# Patient Record
Sex: FEMALE | Race: BLACK OR AFRICAN AMERICAN | NOT HISPANIC OR LATINO | Employment: OTHER | URBAN - METROPOLITAN AREA
[De-identification: names, ages, dates, MRNs, and addresses within clinical notes are randomized per-mention and may not be internally consistent; named-entity substitution may affect disease eponyms.]

---

## 2021-04-08 DIAGNOSIS — Z23 ENCOUNTER FOR IMMUNIZATION: ICD-10-CM

## 2021-06-14 LAB — HBA1C MFR BLD HPLC: 6.5 %

## 2022-07-28 LAB
LEFT EYE DIABETIC RETINOPATHY: NORMAL
RIGHT EYE DIABETIC RETINOPATHY: NORMAL

## 2023-01-18 LAB — HBA1C MFR BLD HPLC: 6.7 %

## 2023-11-16 ENCOUNTER — OFFICE VISIT (OUTPATIENT)
Dept: CARDIOLOGY CLINIC | Facility: CLINIC | Age: 73
End: 2023-11-16
Payer: COMMERCIAL

## 2023-11-16 VITALS
BODY MASS INDEX: 34.23 KG/M2 | OXYGEN SATURATION: 98 % | DIASTOLIC BLOOD PRESSURE: 78 MMHG | HEART RATE: 78 BPM | HEIGHT: 66 IN | WEIGHT: 213 LBS | SYSTOLIC BLOOD PRESSURE: 136 MMHG

## 2023-11-16 DIAGNOSIS — E11.9 TYPE 2 DIABETES MELLITUS WITHOUT COMPLICATION, WITHOUT LONG-TERM CURRENT USE OF INSULIN (HCC): ICD-10-CM

## 2023-11-16 DIAGNOSIS — E78.5 DYSLIPIDEMIA: ICD-10-CM

## 2023-11-16 DIAGNOSIS — R07.9 CHEST PAIN, UNSPECIFIED TYPE: Primary | ICD-10-CM

## 2023-11-16 PROCEDURE — 99204 OFFICE O/P NEW MOD 45 MIN: CPT | Performed by: INTERNAL MEDICINE

## 2023-11-16 PROCEDURE — 93000 ELECTROCARDIOGRAM COMPLETE: CPT | Performed by: INTERNAL MEDICINE

## 2023-11-16 RX ORDER — AMLODIPINE BESYLATE 5 MG/1
5 TABLET ORAL DAILY
COMMUNITY
Start: 2023-09-18

## 2023-11-16 RX ORDER — OMEPRAZOLE 20 MG/1
20 TABLET, DELAYED RELEASE ORAL DAILY
COMMUNITY

## 2023-11-16 RX ORDER — ASPIRIN 81 MG/1
81 TABLET ORAL DAILY
COMMUNITY

## 2023-11-16 RX ORDER — BLOOD-GLUCOSE METER
EACH MISCELLANEOUS
COMMUNITY
Start: 2023-10-02

## 2023-11-16 RX ORDER — OMEPRAZOLE 40 MG/1
CAPSULE, DELAYED RELEASE ORAL
COMMUNITY

## 2023-11-16 RX ORDER — FLUTICASONE PROPIONATE 50 MCG
2 SPRAY, SUSPENSION (ML) NASAL DAILY
COMMUNITY
Start: 2023-09-11

## 2023-11-16 RX ORDER — OLOPATADINE HYDROCHLORIDE 2 MG/ML
1 SOLUTION/ DROPS OPHTHALMIC
COMMUNITY

## 2023-11-16 NOTE — LETTER
November 16, 2023     Latasha Rutherford MD  [address] [facility] [address]    Patient: Jose Angel Ocampo   YOB: 1950   Date of Visit: 11/16/2023       Dear Dr. Ashwini Hernandez:    Thank you for referring Jose Angel Ocampo to me for evaluation. Below are my notes for this consultation. If you have questions, please do not hesitate to call me. I look forward to following your patient along with you. Sincerely,        Yulissa Lopez MD        CC: No Recipients    Yulissa Lopez MD  11/16/2023  1:58 PM  Sign when Signing Visit  Cardiology   Jose Angel Ocampo 74 y.o. female MRN: [id number]   Encounter: [id number]        Reason for Consult / Principal Problem:Chest pain, dyslipidemia    Physician Requesting Consult:  Latasha Rutherford MD    PCP: Latasha Rutherford MD        Assessment/Plan:    >> Chest pain: Possibly cardiac she has numerous risk factors for coronary disease, she has poor exercise capacity and is unlikely to be able to complete a treadmill stress. >> Hyperlipidemia: Has FAILED multiple statins: Failed rosuvastatin, atorvastatin, simvastatin, pravastatin. Has severely elevated LDL cholesterol  >> Type 2 diabetes: On metformin  >> Hypertension: On amlodipine  >> CKD stage II plan:        Plan:    -Check nuclear pharmacologic stress test to evaluate for coronary ischemia  -Start Praluent 75 mg once every 2 weeks, subcutaneously. Will reassess after 1 month of therapy and consider increasing the dose  -Continue amlodipine for hypertension, asked her to keep a log of her blood pressures for 1 week and call in the numbers, will consider adding losartan 25 mg for blood pressure and renal protection.  -Follow-up in 3 months or sooner if testing is abnormal.        CC: Chest pain, dyslipidemia    HPI  74 y.o. female presents to hospitals care.   She has a history of dyslipidemia and has been tried on multiple medications including rosuvastatin, atorvastatin, simvastatin, pravastatin and has failed all of these medications due to severe muscle aches and nausea. She has numerous risk factors for atherosclerotic disease including diabetes, dyslipidemia, hypertension. Her last LDL was 215. She has also been complaining of chest pains on the left side of her chest radiating to the left shoulder. These are not strictly associated with exertion (her physical exertion is minimal). They can occur anytime. Not associated with nausea vomiting or diaphoresis      The patient denies palpitations, orthopnea, PND, pedal edema, syncope, presyncope, diaphoresis, nausea/vomiting       SH: Smoking/Tobacco/Vaping: Never; Alcohol: Never; Drugs: Never            Physical exam  Objective  Vitals: Blood pressure 136/78, pulse 78, height 5' 6" (1.676 m), weight 96.6 kg (213 lb), SpO2 98 %. General:  AO x3, no acute distress  Cardiac:  S1-S2 normal,  No murmurs, rubs or gallops, JVP: Not seen  Lungs:  Clear to auscultation bilaterally, no wheezing or crackles. Abdomen:  Soft, nontender, nondistended. Extremities:  Warm, well perfused, pulses palpable, no ulcers or rashes. Edema: Mild pitting  Neuro: Grossly nonfocall        ======================================================  TREADMILL STRESS  No results found for this or any previous visit.     ----------------------------------------------------------------------------------------------  NUCLEAR STRESS TEST: No results found for this or any previous visit. No results found for this or any previous visit.      --------------------------------------------------------------------------------  CATH:  No results found for this or any previous visit.    --------------------------------------------------------------------------------  ECHO:   No results found for this or any previous visit.     No results found for this or any previous visit.    --------------------------------------------------------------------------------  HOLTER  No results found for this or any previous visit.    --------------------------------------------------------------------------------  CAROTIDS  No results found for this or any previous visit. Diagnoses and all orders for this visit:    Chest pain, unspecified type  -     POCT ECG    Other orders  -     amLODIPine (NORVASC) 5 mg tablet; Take 5 mg by mouth daily  -     aspirin (ECOTRIN LOW STRENGTH) 81 mg EC tablet; Take 81 mg by mouth daily  -     fluticasone (FLONASE) 50 mcg/act nasal spray; 2 sprays daily  -     OneTouch Verio test strip; TEST TWICE A DAY  -     metFORMIN (GLUCOPHAGE) 500 mg tablet; Take 500 mg by mouth in the morning  -     olopatadine HCl (PATADAY) 0.2 % opth drops; 1 drop  -     omeprazole (PriLOSEC) 40 MG capsule; omeprazole 40 mg capsule,delayed release (Patient not taking: Reported on 11/16/2023)  -     omeprazole (PriLOSEC OTC) 20 MG tablet; Take 20 mg by mouth daily       ======================================================          Review of Systems  ROS as noted above, otherwise 12 point review of systems was performed and is negative. Historical Information  No past medical history on file. No past surgical history on file. Social History     Substance and Sexual Activity   Alcohol Use Not on file     Social History     Substance and Sexual Activity   Drug Use Not on file     Social History     Tobacco Use   Smoking Status Not on file   Smokeless Tobacco Not on file     No family history on file. Meds/Allergies  Hospital Medications:   No current facility-administered medications for this visit. Home Medications: (Not in a hospital admission)      Allergies   Allergen Reactions   • Penicillins Hives and Swelling     Tongue swelling   • Simvastatin Myalgia   • Lisinopril Cough         Portions of the record may have been created with voice recognition software.   Occasional wrong words or "sound a like" substitutions may have occurred due to the inherent limitations of voice recognition software. Read the chart carefully and recognize, using context, where substitutions have occurred.

## 2023-11-16 NOTE — PROGRESS NOTES
Cardiology   Nevaeh Cardona 74 y.o. female MRN: [id number]   Encounter: [id number]        Reason for Consult / Principal Problem:Chest pain, dyslipidemia    Physician Requesting Consult:  Frank Gonzalez MD    PCP: Frank Gonzalez MD        Assessment/Plan:    >> Chest pain: Possibly cardiac she has numerous risk factors for coronary disease, she has poor exercise capacity and is unlikely to be able to complete a treadmill stress. >> Hyperlipidemia: Has FAILED multiple statins: Failed rosuvastatin, atorvastatin, simvastatin, pravastatin. Has severely elevated LDL cholesterol  >> Type 2 diabetes: On metformin  >> Hypertension: On amlodipine  >> CKD stage II plan:        Plan:    -Check nuclear pharmacologic stress test to evaluate for coronary ischemia  -Start Praluent 75 mg once every 2 weeks, subcutaneously. Will reassess after 1 month of therapy and consider increasing the dose  -Continue amlodipine for hypertension, asked her to keep a log of her blood pressures for 1 week and call in the numbers, will consider adding losartan 25 mg for blood pressure and renal protection.  -Follow-up in 3 months or sooner if testing is abnormal.        CC: Chest pain, dyslipidemia    HPI  74 y.o. female presents to establish care. She has a history of dyslipidemia and has been tried on multiple medications including rosuvastatin, atorvastatin, simvastatin, pravastatin and has failed all of these medications due to severe muscle aches and nausea. She has numerous risk factors for atherosclerotic disease including diabetes, dyslipidemia, hypertension. Her last LDL was 215. She has also been complaining of chest pains on the left side of her chest radiating to the left shoulder. These are not strictly associated with exertion (her physical exertion is minimal). They can occur anytime.   Not associated with nausea vomiting or diaphoresis      The patient denies palpitations, orthopnea, PND, pedal edema, syncope, presyncope, diaphoresis, nausea/vomiting       SH: Smoking/Tobacco/Vaping: Never; Alcohol: Never; Drugs: Never            Physical exam  Objective   Vitals: Blood pressure 136/78, pulse 78, height 5' 6" (1.676 m), weight 96.6 kg (213 lb), SpO2 98 %. General:  AO x3, no acute distress  Cardiac:  S1-S2 normal,  No murmurs, rubs or gallops, JVP: Not seen  Lungs:  Clear to auscultation bilaterally, no wheezing or crackles. Abdomen:  Soft, nontender, nondistended. Extremities:  Warm, well perfused, pulses palpable, no ulcers or rashes. Edema: Mild pitting  Neuro: Grossly nonfocall        ======================================================  TREADMILL STRESS  No results found for this or any previous visit.     ----------------------------------------------------------------------------------------------  NUCLEAR STRESS TEST: No results found for this or any previous visit. No results found for this or any previous visit.      --------------------------------------------------------------------------------  CATH:  No results found for this or any previous visit.    --------------------------------------------------------------------------------  ECHO:   No results found for this or any previous visit. No results found for this or any previous visit.    --------------------------------------------------------------------------------  HOLTER  No results found for this or any previous visit.    --------------------------------------------------------------------------------  CAROTIDS  No results found for this or any previous visit. Diagnoses and all orders for this visit:    Chest pain, unspecified type  -     POCT ECG    Other orders  -     amLODIPine (NORVASC) 5 mg tablet; Take 5 mg by mouth daily  -     aspirin (ECOTRIN LOW STRENGTH) 81 mg EC tablet;  Take 81 mg by mouth daily  -     fluticasone (FLONASE) 50 mcg/act nasal spray; 2 sprays daily  -     OneTouch Verio test strip; TEST TWICE A DAY  - metFORMIN (GLUCOPHAGE) 500 mg tablet; Take 500 mg by mouth in the morning  -     olopatadine HCl (PATADAY) 0.2 % opth drops; 1 drop  -     omeprazole (PriLOSEC) 40 MG capsule; omeprazole 40 mg capsule,delayed release (Patient not taking: Reported on 11/16/2023)  -     omeprazole (PriLOSEC OTC) 20 MG tablet; Take 20 mg by mouth daily       ======================================================          Review of Systems  ROS as noted above, otherwise 12 point review of systems was performed and is negative. Historical Information   No past medical history on file. No past surgical history on file. Social History     Substance and Sexual Activity   Alcohol Use Not on file     Social History     Substance and Sexual Activity   Drug Use Not on file     Social History     Tobacco Use   Smoking Status Not on file   Smokeless Tobacco Not on file     No family history on file. Meds/Allergies   Hospital Medications:   No current facility-administered medications for this visit. Home Medications: (Not in a hospital admission)      Allergies   Allergen Reactions    Penicillins Hives and Swelling     Tongue swelling    Simvastatin Myalgia    Lisinopril Cough         Portions of the record may have been created with voice recognition software. Occasional wrong words or "sound a like" substitutions may have occurred due to the inherent limitations of voice recognition software. Read the chart carefully and recognize, using context, where substitutions have occurred.

## 2023-11-30 ENCOUNTER — HOSPITAL ENCOUNTER (OUTPATIENT)
Dept: NON INVASIVE DIAGNOSTICS | Facility: CLINIC | Age: 73
Discharge: HOME | End: 2023-11-30
Payer: COMMERCIAL

## 2023-11-30 VITALS — BODY MASS INDEX: 34.23 KG/M2 | WEIGHT: 213 LBS | HEIGHT: 66 IN

## 2023-11-30 DIAGNOSIS — R07.9 CHEST PAIN, UNSPECIFIED TYPE: ICD-10-CM

## 2023-11-30 LAB
MAX HR PERCENT: 75 %
MAX HR: 110 BPM
NUC STRESS EJECTION FRACTION: 62 %
RATE PRESSURE PRODUCT: NORMAL
SL CV REST NUCLEAR ISOTOPE DOSE: 10.51 MCI
SL CV STRESS NUCLEAR ISOTOPE DOSE: 32.2 MCI
SL CV STRESS RECOVERY BP: NORMAL MMHG
SL CV STRESS RECOVERY HR: 81 BPM
SL CV STRESS RECOVERY O2 SAT: 99 %
STRESS ANGINA INDEX: 0
STRESS BASELINE BP: NORMAL MMHG
STRESS BASELINE HR: 58 BPM
STRESS O2 SAT REST: 97 %
STRESS PEAK HR: 96 BPM
STRESS POST ESTIMATED WORKLOAD: 1 METS
STRESS POST O2 SAT PEAK: 99 %
STRESS POST PEAK BP: 160 MMHG
STRESS/REST PERFUSION RATIO: 1.21

## 2023-11-30 PROCEDURE — 78452 HT MUSCLE IMAGE SPECT MULT: CPT | Performed by: INTERNAL MEDICINE

## 2023-11-30 PROCEDURE — A9502 TC99M TETROFOSMIN: HCPCS

## 2023-11-30 PROCEDURE — 78452 HT MUSCLE IMAGE SPECT MULT: CPT

## 2023-11-30 PROCEDURE — 93018 CV STRESS TEST I&R ONLY: CPT | Performed by: INTERNAL MEDICINE

## 2023-11-30 PROCEDURE — 93017 CV STRESS TEST TRACING ONLY: CPT

## 2023-11-30 PROCEDURE — G1004 CDSM NDSC: HCPCS

## 2023-11-30 PROCEDURE — 93016 CV STRESS TEST SUPVJ ONLY: CPT | Performed by: INTERNAL MEDICINE

## 2023-11-30 RX ORDER — REGADENOSON 0.08 MG/ML
0.4 INJECTION, SOLUTION INTRAVENOUS ONCE
Status: COMPLETED | OUTPATIENT
Start: 2023-11-30 | End: 2023-11-30

## 2023-11-30 RX ADMIN — REGADENOSON 0.4 MG: 0.08 INJECTION, SOLUTION INTRAVENOUS at 11:03

## 2023-12-01 ENCOUNTER — TELEPHONE (OUTPATIENT)
Dept: CARDIOLOGY CLINIC | Facility: CLINIC | Age: 73
End: 2023-12-01

## 2023-12-01 ENCOUNTER — DOCUMENTATION (OUTPATIENT)
Dept: CARDIOLOGY CLINIC | Facility: CLINIC | Age: 73
End: 2023-12-01

## 2023-12-01 ENCOUNTER — PREP FOR PROCEDURE (OUTPATIENT)
Dept: CARDIOLOGY CLINIC | Facility: CLINIC | Age: 73
End: 2023-12-01

## 2023-12-01 DIAGNOSIS — R07.9 CHEST PAIN, UNSPECIFIED TYPE: Primary | ICD-10-CM

## 2023-12-01 DIAGNOSIS — R94.39 ABNORMAL STRESS ECG: Primary | ICD-10-CM

## 2023-12-01 DIAGNOSIS — I10 HYPERTENSION, UNSPECIFIED TYPE: Primary | ICD-10-CM

## 2023-12-01 DIAGNOSIS — I25.10 CORONARY ARTERY DISEASE INVOLVING NATIVE CORONARY ARTERY OF NATIVE HEART WITHOUT ANGINA PECTORIS: ICD-10-CM

## 2023-12-01 NOTE — PROGRESS NOTES
Abnormal stress test noted. There appears to be a mild reversible anterior wall defect with a a severe defect in the mid anterior wall (possibly breast artifact). There appears to be a fixed inferolateral wall defect. Will order [facility] to evaluate coronary anatomy. In the interim patient should have started praluent (intolerant to statins), will continue ASA and add metoprolol 25 mg bid.

## 2023-12-01 NOTE — LETTER
2023       Chace Meza              : 1950        MRN: [id number]  [address] [place] [address]     Procedure Name:   CARDIAC  CATHETERIZATION    Procedure date: 2023    Blood work to be done before 12/15/2023    The hospital will contact you the day prior to your procedure, usually between 4PM - 6PM to instruct you on the time and place to report. If you do not hear from a [facility]'s  by 6PM the evening prior to your procedure, please contact the campus that you are scheduled at. LOCATION   [place]: [address] [place] [address] [facility] [phone]       You may have nothing to eat or drink from midnight the night prior to your procedure. You may have a minimal amount of water with your morning medications. DO NOT stop taking Plavix or Aspirin unless advised otherwise. If your procedure is scheduled after 12:00 noon, you may have clear liquids until 8:00AM the morning of your procedure. Clear liquids are 7UP, Ginger Ale, Jello or broth. Arrange for a responsible person to drive you to and from the hospital.    Please shower your procedure and do not use powders or lotions. Please notify us if you have been admitted to the hospital within the past 30 days. Bring a list of daily medications, vitamins, minerals, herbals and nutritional supplements you take. Include dosage and time you take them each day. If packing an overnight bag, pack minimal clothing, you will be given hospital sleepwear. Do not bring money, valuables or jewelry. Wedding band is OK. If you use CPAP machine, bring it to the hospital.      Have your Photo ID and Insurance cards with you. DO NOT take any diabetic medication, including insulin, the morning of the procedure. Oral diabetic medications may include: Glucophage, Prandin, Glyburide, Micronase, Avandia, Glocovance, Precose, Glynase y Starlix.     You should continue to take your morning dose of heart and/or blood pressure medications with a sip of water UNLESS ADVISED OTHERWISE. Special Instructions:    METFORMIN: DO NOT take this medication in the morning of the procedure.         Thank you,   Elzie Bosworth  Surgery Coordinator  [facility]  Carli SINGH  Ph: [phone]

## 2023-12-01 NOTE — TELEPHONE ENCOUNTER
I spoke with the patient regarding her abnormal stress test. She confirmed she is taking praluent and low dose aspirin, with the addition of Metoprolol tartrate 25 mg bid. L heart cath will be scheduled. Patient verbalized understanding to all that was discussed.

## 2023-12-04 NOTE — TELEPHONE ENCOUNTER
Called patient in regard cardiac cath to be schedule. Patient Don't want to schedule procedure until the Dr speak with her  to explained procedure and reason why she is needing this procedure. She mentioned her  is off from work on Thursday, she would like to either have a consult or they can call her . Thank you.

## 2023-12-06 NOTE — TELEPHONE ENCOUNTER
Dr Zan Conti just following on this patient request.  Patient and  would like to speak with you prior to scheduled the procedure. Thank you.

## 2023-12-07 ENCOUNTER — OFFICE VISIT (OUTPATIENT)
Dept: CARDIOLOGY CLINIC | Facility: CLINIC | Age: 73
End: 2023-12-07
Payer: COMMERCIAL

## 2023-12-07 ENCOUNTER — TELEPHONE (OUTPATIENT)
Dept: CARDIOLOGY CLINIC | Facility: CLINIC | Age: 73
End: 2023-12-07

## 2023-12-07 VITALS
BODY MASS INDEX: 34.59 KG/M2 | HEART RATE: 80 BPM | DIASTOLIC BLOOD PRESSURE: 84 MMHG | OXYGEN SATURATION: 98 % | SYSTOLIC BLOOD PRESSURE: 160 MMHG | WEIGHT: 214.3 LBS

## 2023-12-07 DIAGNOSIS — R07.9 CHEST PAIN, UNSPECIFIED TYPE: Primary | ICD-10-CM

## 2023-12-07 DIAGNOSIS — R94.39 ABNORMAL NUCLEAR STRESS TEST: ICD-10-CM

## 2023-12-07 PROCEDURE — 99213 OFFICE O/P EST LOW 20 MIN: CPT | Performed by: INTERNAL MEDICINE

## 2023-12-07 NOTE — TELEPHONE ENCOUNTER
----- Message from Cyndy Morocho MD sent at 12/6/2023  4:34 PM EST -----  Regarding: Appointment  Can you please set her up for a 20 appointment tomorrow afternoon 12/7 at 2/2.30 PM at [address]? I tried calling her and her  but there was no response. She did say in a previous message that her  is off Thursday. Alternatively we could do a tele visit if she cannot come in (I believe they live quite far away).      TY

## 2023-12-07 NOTE — TELEPHONE ENCOUNTER
I called the patient and offered an appt this afternoon, or a phone visit. She said she will call back to let us know.

## 2023-12-07 NOTE — TELEPHONE ENCOUNTER
Patient scheduled for LHC at [facility] on  12/21/2023 with Dr Tuyet Edge. Patient also scheduled to have an echo on this day. Patient aware of general instructions, mail out with labs order. Meds holds: Metformin to hold the morning of the procedure. Can we please check insurance for service.

## 2023-12-08 NOTE — TELEPHONE ENCOUNTER
Tariq Mendez pending [facility] # [id number] for [id number] @ [facility]. Dr. Gonzalez Roche.   Clinical uploaded

## 2023-12-08 NOTE — PROGRESS NOTES
Cardiology   Toshia Reyes 74 y.o. female MRN: [id number]   Encounter: [id number]        Reason for Consult / Principal Problem:Chest pain, dyslipidemia    Physician Requesting Consult:  Katerin Ellis MD    PCP: Katerin Ellis MD        Assessment/Plan:    >> Chest pain: Now with abnormal pharmacologic stress test.  >> Hyperlipidemia: Has FAILED multiple statins: Failed rosuvastatin, atorvastatin, simvastatin, pravastatin. Has severely elevated LDL cholesterol  >> Type 2 diabetes: On metformin  >> Hypertension: On amlodipine  >> CKD stage II         Plan:    -Refer for left heart catheterization to evaluate coronary anatomy  -Risks benefits and alternatives were discussed at length. All questions were answered.  -Continue Praluent 75 mg once every 2 weeks, subcutaneously. -Continue amlodipine 5 mg  -Start metoprolol 25 mg daily  -Continue baby aspirin daily  -Follow-up after left heart cath. 12/7/2023: Patient wanted to discuss stress test result. She continues to have intermittent chest pains. Her pharmacologic stress test demonstrated ischemia possibly in the diagonal and circumflex territories. Cannot exclude LAD ischemia. CC: Chest pain, dyslipidemia    HPI  74 y.o. female presents to establish care. She has a history of dyslipidemia and has been tried on multiple medications including rosuvastatin, atorvastatin, simvastatin, pravastatin and has failed all of these medications due to severe muscle aches and nausea. She has numerous risk factors for atherosclerotic disease including diabetes, dyslipidemia, hypertension. Her last LDL was 215. She has also been complaining of chest pains on the left side of her chest radiating to the left shoulder. These are not strictly associated with exertion (her physical exertion is minimal). They can occur anytime.   Not associated with nausea vomiting or diaphoresis      The patient denies palpitations, orthopnea, PND, pedal edema, syncope, presyncope, diaphoresis, nausea/vomiting       SH: Smoking/Tobacco/Vaping: Never; Alcohol: Never; Drugs: Never            Physical exam  Objective   Vitals: Blood pressure 160/84, pulse 80, weight 97.2 kg (214 lb 4.8 oz), SpO2 98 %. General:  AO x3, no acute distress  Cardiac:  S1-S2 normal,  No murmurs, rubs or gallops, JVP: Not seen  Lungs:  Clear to auscultation bilaterally, no wheezing or crackles. Abdomen:  Soft, nontender, nondistended. Extremities:  Warm, well perfused, pulses palpable, no ulcers or rashes. Edema: Mild pitting  Neuro: Grossly nonfocall        ======================================================  TREADMILL STRESS  No results found for this or any previous visit.     ----------------------------------------------------------------------------------------------  NUCLEAR STRESS TEST: No results found for this or any previous visit. No results found for this or any previous visit.      --------------------------------------------------------------------------------  CATH:  No results found for this or any previous visit.    --------------------------------------------------------------------------------  ECHO:   No results found for this or any previous visit. No results found for this or any previous visit.    --------------------------------------------------------------------------------  HOLTER  No results found for this or any previous visit.    --------------------------------------------------------------------------------  CAROTIDS  No results found for this or any previous visit. There are no diagnoses linked to this encounter.     ======================================================          Review of Systems  ROS as noted above, otherwise 12 point review of systems was performed and is negative. Historical Information   No past medical history on file. No past surgical history on file.   Social History     Substance and Sexual Activity   Alcohol Use Not on file     Social History     Substance and Sexual Activity   Drug Use Not on file     Social History     Tobacco Use   Smoking Status Not on file   Smokeless Tobacco Not on file     No family history on file. Meds/Allergies   Hospital Medications:   No current facility-administered medications for this visit. Home Medications: (Not in a hospital admission)      Allergies   Allergen Reactions    Penicillins Hives and Swelling     Tongue swelling    Simvastatin Myalgia    Lisinopril Cough         Portions of the record may have been created with voice recognition software. Occasional wrong words or "sound a like" substitutions may have occurred due to the inherent limitations of voice recognition software. Read the chart carefully and recognize, using context, where substitutions have occurred.

## 2023-12-13 LAB
CHEST PAIN STATEMENT: NORMAL
MAX DIASTOLIC BP: 92 MMHG
MAX PREDICTED HEART RATE: 145 BPM
PROTOCOL NAME: NORMAL
REASON FOR TERMINATION: NORMAL
STRESS POST EXERCISE DUR MIN: 3 MIN
STRESS POST EXERCISE DUR SEC: 0 SEC
STRESS POST PEAK HR: 110 BPM
STRESS POST PEAK SYSTOLIC BP: 180 MMHG
TARGET HR FORMULA: NORMAL
TEST INDICATION: NORMAL

## 2023-12-14 LAB — HBA1C MFR BLD HPLC: 6.2 %

## 2023-12-19 ENCOUNTER — TELEPHONE (OUTPATIENT)
Dept: CARDIOLOGY CLINIC | Facility: CLINIC | Age: 73
End: 2023-12-19

## 2023-12-19 NOTE — TELEPHONE ENCOUNTER
Spoke with patient in regard cardiac cath and Echo, cancelled for 12/21/2023 due that patient is very sick.  She will call back when feel better to reschedule.

## 2023-12-19 NOTE — TELEPHONE ENCOUNTER
My name is, my name is Pamela.   I'm supposed to do a procedure on Thursday, but I am sick, I'm not well and my doctor said that I should go and take the COVID test before he can treat me     So that's why I'm letting you guys know I don't think I'll be able to do it. Thank you [phone].

## 2023-12-27 DIAGNOSIS — E78.5 DYSLIPIDEMIA: ICD-10-CM

## 2024-02-03 NOTE — H&P (VIEW-ONLY)
Cardiology Consultation Visit       Kacey Bazan   [id number]  1950      [facility]  [address] [place] [address] [place] [address]      Physician Requesting Consult:   No ref. provider found  PCP: Demetrius Charlton MD    Reason for Consultation / Principal Problem:  Mrs. Kacey Bazan is a 73 y.o. female and never smoker with a PMH significant for but not limited to CAD (nonobstructive, 2019), HLD, HTN, DMII, GERD, and Obesity.  She presents to clinic for Interventional Cardiology Consultation .    History of Present Illness:  Mrs. Bazan was at her baseline state of health: independent of adl's, retired as a CNA , though not exercising regularly, when she  had a fall 3 months ago .  She states that she at home and walking downstairs when she slipped, hit her chest against the railing, and slid down the stairs.  She managed the event conservatively but eventually saw our partner, Dr. Jiang for ongoing CP on 11/16/23.  She described resting and exertional pain, so she underwent a NM MPI that was positive for a fixed anterior infarct with roxanna-infarct ischemia.  She was started on praluent but noted constipation on the medication and discontinued it.  She also noted increased depression and lightheadedness on metoprolol, so that too was discontinued. She was referred to C but had reservations. She states her last LHC was at [facility] in [place], [place] and was negative obstructive CAD.    For her intermittent CP and HERNANDEZ, she is now ready to proceed with [facility] for evalaution.  She currently denies any diaphoresis, n/v, sob, palpitations, syncope, orthopnea, pnd, or ble edema.  She denies any recent hospitalizations, prior cardiac history, family history of early cad, or family history of scd. She notes fair control of her diet and exercise habits. She reports a diet that is somewhat balanced but has room for improvement, salt intake that is well  controlled, sufficient daily water intake, no caffeine intake, no alcohol intake, and no dedicated exercise. She is otherwise compliant with medications but does not maintain a detailed BP log.  Of note, the patient's cardiac risk factor(s) include: advanced age, hypertension, dyslipidemia, diabetes mellitus, obesity, and sedentary lifestyle.    Assessment & Plan   CP, etiology likely cardiac given NM MPI  Patient continues to have recurrent symptoms, Pharmacologic NM MPI with a fixed ant defect with roxanna-infarct ischemia  Ischemic testing with [facility], Proceed with already ordered TTE, Cont GDMT for CAD on asa / trial of low dose cresto qod / trial of repatha / hold on bb for now, Cont aggressive risk factor modification, Maintain BP log, Cont counseling on diet/lifestyle modification for weight management as well  Monitor closely for symptomatic changes, Record BP/HR and log if symptoms return, ED/Clinic precautions reviewed    HTN, long-standing  No home BP log for review, but clinic SBP in the 135-160 mmHg range  Cont current medication regimen, cont counseling on low-sodium diet, BP log given, Review the importance of maintaining a home BP log  Monitor BP at home routinely and review log on next visit    HLD, recent FLP:  /  / HDL 44 /  on 04/24/23  Poorly controlled, Previously on a statin, but now diet/lifestyle managed  Check FLP at [facility], Will start crestor 5 qod and trial of repatha, Cont counseling on diet and lifestyle modification  Monitor FLP as noted above, will reassess on next visit    DM Type II, most recent A1c 6.2 on 12/14/23  Tolerating medical mgmt without progressive symptoms  Check A1c per PCP, Cont counseling on diet and lifestyle changes  Monitor labs as noted above, will reassess on next visit    Obesity, Body mass index is 33.89 kg/m².  Weight has improved significant with a 70 lb weight loss from 285 to 210 two years ago  Cont to review the importance of diet and lifestyle  modification  Will monitor routinely at this time    Discussion / Summary:  Precautions and reasons to call our office or proceed to ER were discussed in detail. Patient expressed understanding and questions were answered. Plan on follow up in-clinic in 4 months.    Office Visit Diagnosis:  1. Chest pain, unspecified type        2. Abnormal nuclear stress test        3. Hyperlipidemia, unspecified hyperlipidemia type  Evolocumab 140 MG/ML SOAJ    rosuvastatin (CRESTOR) 5 mg tablet    Lipid panel      4. Hypertension, unspecified type        5. Type 2 diabetes mellitus without complication, without long-term current use of insulin ([facility])        6. Obesity (BMI 30-39.9)        7. Gastroesophageal reflux disease, unspecified whether esophagitis present            Subjective   Review of Systems   Constitutional: Negative for chills, diaphoresis, fever and malaise/fatigue.   HENT:  Negative for congestion and sore throat.    Eyes:  Negative for blurred vision and double vision.   Cardiovascular:  Positive for chest pain (left sided, resting and exertional), claudication, dyspnea on exertion (only if she goes quickly up stairs, symptoms improved off metoprolol) and near-syncope (when she was taking metoprolol). Negative for leg swelling, orthopnea, palpitations, paroxysmal nocturnal dyspnea and syncope.   Respiratory:  Negative for cough, shortness of breath, sleep disturbances due to breathing, snoring and wheezing.    Hematologic/Lymphatic: Negative for bleeding problem. Bruises/bleeds easily (while on praluent).   Skin:  Negative for itching and rash.   Musculoskeletal:  Negative for joint pain and joint swelling.   Gastrointestinal:  Positive for constipation (on praluent). Negative for abdominal pain, diarrhea, nausea and vomiting.   Genitourinary:  Negative for dysuria and hematuria.   Neurological:  Negative for numbness and paresthesias.   Psychiatric/Behavioral:  Positive for depression (while on the metoprolol).  The patient is not nervous/anxious.      The following portions of the patient's history were reviewed and updated as appropriate: past medical history, past social history, past family history, past surgical history, current medications, allergies, past surgical history and problem list.  No past medical history on file.  Social History     Socioeconomic History   • Marital status: /Civil Union     Spouse name: Not on file   • Number of children: Not on file   • Years of education: Not on file   • Highest education level: Not on file   Occupational History   • Not on file   Tobacco Use   • Smoking status: Not on file   • Smokeless tobacco: Not on file   Substance and Sexual Activity   • Alcohol use: Not on file   • Drug use: Not on file   • Sexual activity: Not on file   Other Topics Concern   • Not on file   Social History Narrative   • Not on file     Social Determinants of Health     Financial Resource Strain: Not on file   Food Insecurity: Not on file   Transportation Needs: Not on file   Physical Activity: Not on file   Stress: Not on file   Social Connections: Not on file   Intimate Partner Violence: Not on file   Housing Stability: Not on file     No family history on file.  No past surgical history on file.    Current Outpatient Medications:   •  amLODIPine (NORVASC) 5 mg tablet, Take 5 mg by mouth daily, Disp: , Rfl:   •  aspirin (ECOTRIN LOW STRENGTH) 81 mg EC tablet, Take 81 mg by mouth daily, Disp: , Rfl:   •  Evolocumab 140 MG/ML SOAJ, Inject 1 mL (140 mg total) under the skin every 14 (fourteen) days for 3 doses, Disp: 3 mL, Rfl: 0  •  fluticasone (FLONASE) 50 mcg/act nasal spray, 2 sprays into each nostril if needed, Disp: , Rfl:   •  metFORMIN (GLUCOPHAGE) 500 mg tablet, Take 500 mg by mouth in the morning, Disp: , Rfl:   •  olopatadine HCl (PATADAY) 0.2 % opth drops, 1 drop, Disp: , Rfl:   •  omeprazole (PriLOSEC OTC) 20 MG tablet, Take 20 mg by mouth if needed, Disp: , Rfl:   •   "rosuvastatin (CRESTOR) 5 mg tablet, Take 1 tablet (5 mg total) by mouth every other day, Disp: 15 tablet, Rfl: 0  •  Alirocumab 75 MG/ML SOAJ, Inject 75 mg under the skin every 14 (fourteen) days for 24 doses (Patient not taking: Reported on 2/8/2024), Disp: 2 mL, Rfl: 11  •  metoprolol tartrate (LOPRESSOR) 25 mg tablet, Take 1 tablet (25 mg total) by mouth every 12 (twelve) hours (Patient not taking: Reported on 12/7/2023), Disp: 60 tablet, Rfl: 5  •  omeprazole (PriLOSEC) 40 MG capsule, omeprazole 40 mg capsule,delayed release (Patient not taking: Reported on 11/16/2023), Disp: , Rfl:   •  OneTouch Verio test strip, TEST TWICE A DAY, Disp: , Rfl:   Allergies   Allergen Reactions   • Penicillins Hives and Swelling     Tongue swelling   • Simvastatin Myalgia   • Metoprolol Dizziness and Confusion     Sweating, cold hands and feet.    • Praluent [Alirocumab] Dizziness     Bruising     • Lisinopril Cough     There is no problem list on file for this patient.      Objective     Vitals:    02/08/24 0851   BP: 148/86   BP Location: Right arm   Patient Position: Sitting   Cuff Size: Large   Pulse: 83   SpO2: 95%   Weight: 95.3 kg (210 lb)   Height: 5' 6\" (1.676 m)     Body mass index is 33.89 kg/m².  ?  Physical Exam  Constitutional:       General: She is not in acute distress.     Appearance: She is obese. She is not toxic-appearing.   HENT:      Head: Normocephalic and atraumatic.      Right Ear: External ear normal.      Left Ear: External ear normal.      Nose: Nose normal.   Eyes:      General: No scleral icterus.        Right eye: No discharge.         Left eye: No discharge.   Neck:      Vascular: No carotid bruit.   Cardiovascular:      Rate and Rhythm: Normal rate and regular rhythm.      Pulses: Normal pulses.      Heart sounds: No murmur heard.     No gallop.   Pulmonary:      Effort: Pulmonary effort is normal. No respiratory distress.      Breath sounds: No wheezing or rales.   Musculoskeletal:      Cervical " "back: Neck supple.      Right lower leg: No edema.      Left lower leg: No edema.   Skin:     General: Skin is warm and dry.      Capillary Refill: Capillary refill takes less than 2 seconds.   Neurological:      General: No focal deficit present.      Mental Status: She is alert and oriented to person, place, and time.   Psychiatric:         Mood and Affect: Mood normal.         Behavior: Behavior normal.         Labs:  Lab Results   Component Value Date    K 4.7 05/06/2022     05/06/2022    CO2 28 05/06/2022    BUN 16 05/06/2022    CREATININE 1.30 (H) 05/06/2022    GLUC 120 (H) 05/06/2022    AST 18 05/06/2022    ALT 10 05/06/2022    TBILI 0.5 05/06/2022    ALB 3.8 05/06/2022    CALCIUM 9.4 05/06/2022      No results found for: \"WBC\", \"HGB\", \"HCT\", \"PLT\", \"INR\", \"IRON\", \"FERRITIN\", \"TIBC\", \"TRANSFERRIN\"   No results found for: \"CHOLESTEROL\", \"TRIG\", \"HDL\", \"LDLDIRECT\", \"LDLCALC\"  Lab Results   Component Value Date    HGBA1C 6.2 12/14/2023     No results found for: \"TSH\", \"FT4\"  No results found for: \"HSTNI0\", \"HSTNI2\", \"HSTNI4\", \"TROPONINI\"  No results found for: \"BNP\", \"NTBNP\"     Imaging:  I have personally reviewed pertinent reports.  No results found.    Cardiac Studies:  EKG:   Date: 11/16/23, sinus rhythm with sinus arrhythmia, lvh with repolarization abnormality    Tele:   No results found for this or any previous visit.     Holter:   No results found for this or any previous visit.    Recent Device Check:  No results found for this or any previous visit.    Previous EPS/Interventions:  No results found for this or any previous visit.    Previous Cath/PCI:  No results found for this or any previous visit.    Previous STRESS TEST:  Results for orders placed during the hospital encounter of 11/30/23  NM myocardial perfusion spect (rx stress and/or rest)  Interpretation Summary  •  Stress ECG: No ST deviation is noted. There were no arrhythmias during stress. There were no arrhythmias during recovery. . " The ECG was not diagnostic due to resting ST-T abnormalities.  •  Stress Function: Left ventricular function post-stress is normal. Stress ejection fraction is 62 %.  •  Perfusion: There is a left ventricular perfusion defect that is large in size with moderate reduction in uptake present in the entire anterior location(s) that is partially reversible. There is likely a small amount of artifact caused by breast attenuation.  •  Stress Combined Conclusion: Left ventricular perfusion is abnormal. There is mild roxanna-infarct ischemia of the entire anterior wall.  Abnormal pharmacologic perfusion scan. There is an infarct of the entire anterior wall with mild roxanna-infarct ischemia.     ECHO:  No results found for this or any previous visit.    QUINCY:  No results found for this or any previous visit.    CMR:  No results found for this or any previous visit.    Counseling / Coordination of Care:  During our visit, I spent 40 minutes with the patient, and greater than 55% of this time was spent on counseling and coordination of care, including addressing diagnostic results, prognosis, risks and benefits of treatment options, instructions for management, patient/family education, importance of treatment compliance, along with risk factor reductions.    Dictation Disclaimer:  This note was completed in part utilizing [employer] direct voice recognition software. Grammatical errors, random word insertion, spelling mistakes, and incomplete sentences may be an occasional consequence of the system secondary to software limitations, ambient noise and hardware issues. At the time of dictation, efforts were made to edit, clarify and /or correct errors.  Please read the chart carefully and recognize, using context, where substitutions have occurred.  If you have any questions or concerns about the context, text or information contained within the body of this dictation, please contact myself, the provider, for further  clarification.     Valerie Alvarez, DO 02/08/24

## 2024-02-03 NOTE — PROGRESS NOTES
Cardiology Consultation Visit       Kacey Bazan   [id number]  1950      HEART & VASCULAR [facility]  [address] [place] [address] [place] [address]      Physician Requesting Consult:   No ref. provider found  PCP: Demetrius Charlton MD    Reason for Consultation / Principal Problem:  Mrs. Kacey Bazan is a 73 y.o. female and never smoker with a PMH significant for but not limited to CAD (nonobstructive, 2019), HLD, HTN, DMII, GERD, and Obesity.  She presents to clinic for Interventional Cardiology Consultation .    History of Present Illness:  Mrs. Bazan was at her baseline state of health: independent of adl's, retired as a CNA , though not exercising regularly, when she  had a fall 3 months ago .  She states that she at home and walking downstairs when she slipped, hit her chest against the railing, and slid down the stairs.  She managed the event conservatively but eventually saw our partner, Dr. Jiang for ongoing CP on 11/16/23.  She described resting and exertional pain, so she underwent a NM MPI that was positive for a fixed anterior infarct with roxanna-infarct ischemia.  She was started on praluent but noted constipation on the medication and discontinued it.  She also noted increased depression and lightheadedness on metoprolol, so that too was discontinued. She was referred to C but had reservations. She states her last LHC was at [facility] in [place], [place] and was negative obstructive CAD.    For her intermittent CP and HERNANDEZ, she is now ready to proceed with [facility] for evalaution.  She currently denies any diaphoresis, n/v, sob, palpitations, syncope, orthopnea, pnd, or ble edema.  She denies any recent hospitalizations, prior cardiac history, family history of early cad, or family history of scd. She notes fair control of her diet and exercise habits. She reports a diet that is somewhat balanced but has room for improvement, salt intake that is well  controlled, sufficient daily water intake, no caffeine intake, no alcohol intake, and no dedicated exercise. She is otherwise compliant with medications but does not maintain a detailed BP log.  Of note, the patient's cardiac risk factor(s) include: advanced age, hypertension, dyslipidemia, diabetes mellitus, obesity, and sedentary lifestyle.    Assessment & Plan   CP, etiology likely cardiac given NM MPI  Patient continues to have recurrent symptoms, Pharmacologic NM MPI with a fixed ant defect with roxanna-infarct ischemia  Ischemic testing with [facility], Proceed with already ordered TTE, Cont GDMT for CAD on asa / trial of low dose cresto qod / trial of repatha / hold on bb for now, Cont aggressive risk factor modification, Maintain BP log, Cont counseling on diet/lifestyle modification for weight management as well  Monitor closely for symptomatic changes, Record BP/HR and log if symptoms return, ED/Clinic precautions reviewed    HTN, long-standing  No home BP log for review, but clinic SBP in the 135-160 mmHg range  Cont current medication regimen, cont counseling on low-sodium diet, BP log given, Review the importance of maintaining a home BP log  Monitor BP at home routinely and review log on next visit    HLD, recent FLP:  /  / HDL 44 /  on 04/24/23  Poorly controlled, Previously on a statin, but now diet/lifestyle managed  Check FLP at [facility], Will start crestor 5 qod and trial of repatha, Cont counseling on diet and lifestyle modification  Monitor FLP as noted above, will reassess on next visit    DM Type II, most recent A1c 6.2 on 12/14/23  Tolerating medical mgmt without progressive symptoms  Check A1c per PCP, Cont counseling on diet and lifestyle changes  Monitor labs as noted above, will reassess on next visit    Obesity, Body mass index is 33.89 kg/m².  Weight has improved significant with a 70 lb weight loss from 285 to 210 two years ago  Cont to review the importance of diet and lifestyle  modification  Will monitor routinely at this time    Discussion / Summary:  Precautions and reasons to call our office or proceed to ER were discussed in detail. Patient expressed understanding and questions were answered. Plan on follow up in-clinic in 4 months.    Office Visit Diagnosis:  1. Chest pain, unspecified type        2. Abnormal nuclear stress test        3. Hyperlipidemia, unspecified hyperlipidemia type  Evolocumab 140 MG/ML SOAJ    rosuvastatin (CRESTOR) 5 mg tablet    Lipid panel      4. Hypertension, unspecified type        5. Type 2 diabetes mellitus without complication, without long-term current use of insulin ([facility])        6. Obesity (BMI 30-39.9)        7. Gastroesophageal reflux disease, unspecified whether esophagitis present            Subjective   Review of Systems   Constitutional: Negative for chills, diaphoresis, fever and malaise/fatigue.   HENT:  Negative for congestion and sore throat.    Eyes:  Negative for blurred vision and double vision.   Cardiovascular:  Positive for chest pain (left sided, resting and exertional), claudication, dyspnea on exertion (only if she goes quickly up stairs, symptoms improved off metoprolol) and near-syncope (when she was taking metoprolol). Negative for leg swelling, orthopnea, palpitations, paroxysmal nocturnal dyspnea and syncope.   Respiratory:  Negative for cough, shortness of breath, sleep disturbances due to breathing, snoring and wheezing.    Hematologic/Lymphatic: Negative for bleeding problem. Bruises/bleeds easily (while on praluent).   Skin:  Negative for itching and rash.   Musculoskeletal:  Negative for joint pain and joint swelling.   Gastrointestinal:  Positive for constipation (on praluent). Negative for abdominal pain, diarrhea, nausea and vomiting.   Genitourinary:  Negative for dysuria and hematuria.   Neurological:  Negative for numbness and paresthesias.   Psychiatric/Behavioral:  Positive for depression (while on the metoprolol).  The patient is not nervous/anxious.      The following portions of the patient's history were reviewed and updated as appropriate: past medical history, past social history, past family history, past surgical history, current medications, allergies, past surgical history and problem list.  No past medical history on file.  Social History     Socioeconomic History   • Marital status: /Civil Union     Spouse name: Not on file   • Number of children: Not on file   • Years of education: Not on file   • Highest education level: Not on file   Occupational History   • Not on file   Tobacco Use   • Smoking status: Not on file   • Smokeless tobacco: Not on file   Substance and Sexual Activity   • Alcohol use: Not on file   • Drug use: Not on file   • Sexual activity: Not on file   Other Topics Concern   • Not on file   Social History Narrative   • Not on file     Social Determinants of Health     Financial Resource Strain: Not on file   Food Insecurity: Not on file   Transportation Needs: Not on file   Physical Activity: Not on file   Stress: Not on file   Social Connections: Not on file   Intimate Partner Violence: Not on file   Housing Stability: Not on file     No family history on file.  No past surgical history on file.    Current Outpatient Medications:   •  amLODIPine (NORVASC) 5 mg tablet, Take 5 mg by mouth daily, Disp: , Rfl:   •  aspirin (ECOTRIN LOW STRENGTH) 81 mg EC tablet, Take 81 mg by mouth daily, Disp: , Rfl:   •  Evolocumab 140 MG/ML SOAJ, Inject 1 mL (140 mg total) under the skin every 14 (fourteen) days for 3 doses, Disp: 3 mL, Rfl: 0  •  fluticasone (FLONASE) 50 mcg/act nasal spray, 2 sprays into each nostril if needed, Disp: , Rfl:   •  metFORMIN (GLUCOPHAGE) 500 mg tablet, Take 500 mg by mouth in the morning, Disp: , Rfl:   •  olopatadine HCl (PATADAY) 0.2 % opth drops, 1 drop, Disp: , Rfl:   •  omeprazole (PriLOSEC OTC) 20 MG tablet, Take 20 mg by mouth if needed, Disp: , Rfl:   •   "rosuvastatin (CRESTOR) 5 mg tablet, Take 1 tablet (5 mg total) by mouth every other day, Disp: 15 tablet, Rfl: 0  •  Alirocumab 75 MG/ML SOAJ, Inject 75 mg under the skin every 14 (fourteen) days for 24 doses (Patient not taking: Reported on 2/8/2024), Disp: 2 mL, Rfl: 11  •  metoprolol tartrate (LOPRESSOR) 25 mg tablet, Take 1 tablet (25 mg total) by mouth every 12 (twelve) hours (Patient not taking: Reported on 12/7/2023), Disp: 60 tablet, Rfl: 5  •  omeprazole (PriLOSEC) 40 MG capsule, omeprazole 40 mg capsule,delayed release (Patient not taking: Reported on 11/16/2023), Disp: , Rfl:   •  OneTouch Verio test strip, TEST TWICE A DAY, Disp: , Rfl:   Allergies   Allergen Reactions   • Penicillins Hives and Swelling     Tongue swelling   • Simvastatin Myalgia   • Metoprolol Dizziness and Confusion     Sweating, cold hands and feet.    • Praluent [Alirocumab] Dizziness     Bruising     • Lisinopril Cough     There is no problem list on file for this patient.      Objective     Vitals:    02/08/24 0851   BP: 148/86   BP Location: Right arm   Patient Position: Sitting   Cuff Size: Large   Pulse: 83   SpO2: 95%   Weight: 95.3 kg (210 lb)   Height: 5' 6\" (1.676 m)     Body mass index is 33.89 kg/m².  ?  Physical Exam  Constitutional:       General: She is not in acute distress.     Appearance: She is obese. She is not toxic-appearing.   HENT:      Head: Normocephalic and atraumatic.      Right Ear: External ear normal.      Left Ear: External ear normal.      Nose: Nose normal.   Eyes:      General: No scleral icterus.        Right eye: No discharge.         Left eye: No discharge.   Neck:      Vascular: No carotid bruit.   Cardiovascular:      Rate and Rhythm: Normal rate and regular rhythm.      Pulses: Normal pulses.      Heart sounds: No murmur heard.     No gallop.   Pulmonary:      Effort: Pulmonary effort is normal. No respiratory distress.      Breath sounds: No wheezing or rales.   Musculoskeletal:      Cervical " "back: Neck supple.      Right lower leg: No edema.      Left lower leg: No edema.   Skin:     General: Skin is warm and dry.      Capillary Refill: Capillary refill takes less than 2 seconds.   Neurological:      General: No focal deficit present.      Mental Status: She is alert and oriented to person, place, and time.   Psychiatric:         Mood and Affect: Mood normal.         Behavior: Behavior normal.         Labs:  Lab Results   Component Value Date    K 4.7 05/06/2022     05/06/2022    CO2 28 05/06/2022    BUN 16 05/06/2022    CREATININE 1.30 (H) 05/06/2022    GLUC 120 (H) 05/06/2022    AST 18 05/06/2022    ALT 10 05/06/2022    TBILI 0.5 05/06/2022    ALB 3.8 05/06/2022    CALCIUM 9.4 05/06/2022      No results found for: \"WBC\", \"HGB\", \"HCT\", \"PLT\", \"INR\", \"IRON\", \"FERRITIN\", \"TIBC\", \"TRANSFERRIN\"   No results found for: \"CHOLESTEROL\", \"TRIG\", \"HDL\", \"LDLDIRECT\", \"LDLCALC\"  Lab Results   Component Value Date    HGBA1C 6.2 12/14/2023     No results found for: \"TSH\", \"FT4\"  No results found for: \"HSTNI0\", \"HSTNI2\", \"HSTNI4\", \"TROPONINI\"  No results found for: \"BNP\", \"NTBNP\"     Imaging:  I have personally reviewed pertinent reports.  No results found.    Cardiac Studies:  EKG:   Date: 11/16/23, sinus rhythm with sinus arrhythmia, lvh with repolarization abnormality    Tele:   No results found for this or any previous visit.     Holter:   No results found for this or any previous visit.    Recent Device Check:  No results found for this or any previous visit.    Previous EPS/Interventions:  No results found for this or any previous visit.    Previous Cath/PCI:  No results found for this or any previous visit.    Previous STRESS TEST:  Results for orders placed during the hospital encounter of 11/30/23  NM myocardial perfusion spect (rx stress and/or rest)  Interpretation Summary  •  Stress ECG: No ST deviation is noted. There were no arrhythmias during stress. There were no arrhythmias during recovery. . " The ECG was not diagnostic due to resting ST-T abnormalities.  •  Stress Function: Left ventricular function post-stress is normal. Stress ejection fraction is 62 %.  •  Perfusion: There is a left ventricular perfusion defect that is large in size with moderate reduction in uptake present in the entire anterior location(s) that is partially reversible. There is likely a small amount of artifact caused by breast attenuation.  •  Stress Combined Conclusion: Left ventricular perfusion is abnormal. There is mild roxanna-infarct ischemia of the entire anterior wall.  Abnormal pharmacologic perfusion scan. There is an infarct of the entire anterior wall with mild roxanna-infarct ischemia.     ECHO:  No results found for this or any previous visit.    QUINCY:  No results found for this or any previous visit.    CMR:  No results found for this or any previous visit.    Counseling / Coordination of Care:  During our visit, I spent 40 minutes with the patient, and greater than 55% of this time was spent on counseling and coordination of care, including addressing diagnostic results, prognosis, risks and benefits of treatment options, instructions for management, patient/family education, importance of treatment compliance, along with risk factor reductions.    Dictation Disclaimer:  This note was completed in part utilizing [employer] direct voice recognition software. Grammatical errors, random word insertion, spelling mistakes, and incomplete sentences may be an occasional consequence of the system secondary to software limitations, ambient noise and hardware issues. At the time of dictation, efforts were made to edit, clarify and /or correct errors.  Please read the chart carefully and recognize, using context, where substitutions have occurred.  If you have any questions or concerns about the context, text or information contained within the body of this dictation, please contact myself, the provider, for further  clarification.     Valerie Alvarez, DO 02/08/24

## 2024-02-08 ENCOUNTER — OFFICE VISIT (OUTPATIENT)
Dept: CARDIOLOGY CLINIC | Facility: CLINIC | Age: 74
End: 2024-02-08
Payer: COMMERCIAL

## 2024-02-08 ENCOUNTER — PREP FOR PROCEDURE (OUTPATIENT)
Dept: CARDIOLOGY CLINIC | Facility: CLINIC | Age: 74
End: 2024-02-08

## 2024-02-08 VITALS
HEART RATE: 83 BPM | SYSTOLIC BLOOD PRESSURE: 148 MMHG | BODY MASS INDEX: 33.75 KG/M2 | WEIGHT: 210 LBS | OXYGEN SATURATION: 95 % | HEIGHT: 66 IN | DIASTOLIC BLOOD PRESSURE: 86 MMHG

## 2024-02-08 DIAGNOSIS — I10 HYPERTENSION, UNSPECIFIED TYPE: ICD-10-CM

## 2024-02-08 DIAGNOSIS — R07.9 CHEST PAIN, UNSPECIFIED TYPE: Primary | ICD-10-CM

## 2024-02-08 DIAGNOSIS — K21.9 GASTROESOPHAGEAL REFLUX DISEASE, UNSPECIFIED WHETHER ESOPHAGITIS PRESENT: ICD-10-CM

## 2024-02-08 DIAGNOSIS — R94.39 ABNORMAL NUCLEAR STRESS TEST: Primary | ICD-10-CM

## 2024-02-08 DIAGNOSIS — E78.5 HYPERLIPIDEMIA, UNSPECIFIED HYPERLIPIDEMIA TYPE: ICD-10-CM

## 2024-02-08 DIAGNOSIS — E11.9 TYPE 2 DIABETES MELLITUS WITHOUT COMPLICATION, WITHOUT LONG-TERM CURRENT USE OF INSULIN (HCC): ICD-10-CM

## 2024-02-08 DIAGNOSIS — R94.39 ABNORMAL NUCLEAR STRESS TEST: ICD-10-CM

## 2024-02-08 DIAGNOSIS — E66.9 OBESITY (BMI 30-39.9): ICD-10-CM

## 2024-02-08 PROCEDURE — 99204 OFFICE O/P NEW MOD 45 MIN: CPT | Performed by: INTERNAL MEDICINE

## 2024-02-08 RX ORDER — ROSUVASTATIN CALCIUM 5 MG/1
5 TABLET, COATED ORAL EVERY OTHER DAY
Qty: 15 TABLET | Refills: 0 | Status: SHIPPED | OUTPATIENT
Start: 2024-02-08 | End: 2024-03-09

## 2024-02-08 RX ORDER — SODIUM CHLORIDE 9 MG/ML
125 INJECTION, SOLUTION INTRAVENOUS CONTINUOUS
OUTPATIENT
Start: 2024-02-08

## 2024-02-08 RX ORDER — ASPIRIN 81 MG/1
324 TABLET, CHEWABLE ORAL ONCE
OUTPATIENT
Start: 2024-02-08 | End: 2024-02-08

## 2024-03-07 ENCOUNTER — APPOINTMENT (OUTPATIENT)
Dept: NON INVASIVE DIAGNOSTICS | Facility: HOSPITAL | Age: 74
End: 2024-03-07
Payer: COMMERCIAL

## 2024-03-07 ENCOUNTER — HOSPITAL ENCOUNTER (OUTPATIENT)
Facility: HOSPITAL | Age: 74
Setting detail: OUTPATIENT SURGERY
Discharge: HOME | End: 2024-03-08
Attending: INTERNAL MEDICINE | Admitting: INTERNAL MEDICINE
Payer: COMMERCIAL

## 2024-03-07 DIAGNOSIS — N18.31 STAGE 3A CHRONIC KIDNEY DISEASE (HCC): ICD-10-CM

## 2024-03-07 DIAGNOSIS — Z78.9 STATIN INTOLERANCE: ICD-10-CM

## 2024-03-07 DIAGNOSIS — Z95.5 S/P DRUG ELUTING CORONARY STENT PLACEMENT: Primary | ICD-10-CM

## 2024-03-07 DIAGNOSIS — R42 DIZZINESS: ICD-10-CM

## 2024-03-07 DIAGNOSIS — R94.39 ABNORMAL NUCLEAR STRESS TEST: ICD-10-CM

## 2024-03-07 PROBLEM — I25.10 CAD (CORONARY ARTERY DISEASE): Status: ACTIVE | Noted: 2024-03-07

## 2024-03-07 LAB
ANION GAP SERPL CALCULATED.3IONS-SCNC: 10 MMOL/L
ATRIAL RATE: 61 BPM
ATRIAL RATE: 80 BPM
BUN SERPL-MCNC: 11 MG/DL (ref 5–25)
CALCIUM SERPL-MCNC: 9 MG/DL (ref 8.4–10.2)
CHLORIDE SERPL-SCNC: 105 MMOL/L (ref 96–108)
CO2 SERPL-SCNC: 27 MMOL/L (ref 21–32)
CREAT SERPL-MCNC: 0.98 MG/DL (ref 0.6–1.3)
GFR SERPL CREATININE-BSD FRML MDRD: 56 ML/MIN/1.73SQ M
GLUCOSE P FAST SERPL-MCNC: 114 MG/DL (ref 65–99)
GLUCOSE SERPL-MCNC: 108 MG/DL (ref 65–140)
GLUCOSE SERPL-MCNC: 114 MG/DL (ref 65–140)
GLUCOSE SERPL-MCNC: 137 MG/DL (ref 65–140)
GLUCOSE SERPL-MCNC: 94 MG/DL (ref 65–140)
KCT BLD-ACNC: 234 SEC (ref 89–137)
KCT BLD-ACNC: 283 SEC (ref 89–137)
P AXIS: 34 DEGREES
P AXIS: 64 DEGREES
POTASSIUM SERPL-SCNC: 4.3 MMOL/L (ref 3.5–5.3)
PR INTERVAL: 132 MS
PR INTERVAL: 132 MS
QRS AXIS: -2 DEGREES
QRS AXIS: -4 DEGREES
QRSD INTERVAL: 76 MS
QRSD INTERVAL: 76 MS
QT INTERVAL: 382 MS
QT INTERVAL: 426 MS
QTC INTERVAL: 428 MS
QTC INTERVAL: 440 MS
SODIUM SERPL-SCNC: 142 MMOL/L (ref 135–147)
SPECIMEN SOURCE: ABNORMAL
SPECIMEN SOURCE: ABNORMAL
T WAVE AXIS: -10 DEGREES
T WAVE AXIS: 8 DEGREES
VENTRICULAR RATE: 61 BPM
VENTRICULAR RATE: 80 BPM

## 2024-03-07 PROCEDURE — C1725 CATH, TRANSLUMIN NON-LASER: HCPCS | Performed by: INTERNAL MEDICINE

## 2024-03-07 PROCEDURE — NC001 PR NO CHARGE: Performed by: INTERNAL MEDICINE

## 2024-03-07 PROCEDURE — 99152 MOD SED SAME PHYS/QHP 5/>YRS: CPT | Performed by: INTERNAL MEDICINE

## 2024-03-07 PROCEDURE — 92928 PRQ TCAT PLMT NTRAC ST 1 LES: CPT | Performed by: INTERNAL MEDICINE

## 2024-03-07 PROCEDURE — 93005 ELECTROCARDIOGRAM TRACING: CPT

## 2024-03-07 PROCEDURE — C1894 INTRO/SHEATH, NON-LASER: HCPCS | Performed by: INTERNAL MEDICINE

## 2024-03-07 PROCEDURE — 93010 ELECTROCARDIOGRAM REPORT: CPT | Performed by: INTERNAL MEDICINE

## 2024-03-07 PROCEDURE — C1769 GUIDE WIRE: HCPCS | Performed by: INTERNAL MEDICINE

## 2024-03-07 PROCEDURE — 82948 REAGENT STRIP/BLOOD GLUCOSE: CPT

## 2024-03-07 PROCEDURE — 93306 TTE W/DOPPLER COMPLETE: CPT

## 2024-03-07 PROCEDURE — 99153 MOD SED SAME PHYS/QHP EA: CPT | Performed by: INTERNAL MEDICINE

## 2024-03-07 PROCEDURE — 93306 TTE W/DOPPLER COMPLETE: CPT | Performed by: INTERNAL MEDICINE

## 2024-03-07 PROCEDURE — 93454 CORONARY ARTERY ANGIO S&I: CPT | Performed by: INTERNAL MEDICINE

## 2024-03-07 PROCEDURE — 80048 BASIC METABOLIC PNL TOTAL CA: CPT | Performed by: INTERNAL MEDICINE

## 2024-03-07 PROCEDURE — C1874 STENT, COATED/COV W/DEL SYS: HCPCS | Performed by: INTERNAL MEDICINE

## 2024-03-07 PROCEDURE — C1887 CATHETER, GUIDING: HCPCS | Performed by: INTERNAL MEDICINE

## 2024-03-07 PROCEDURE — C9600 PERC DRUG-EL COR STENT SING: HCPCS | Performed by: INTERNAL MEDICINE

## 2024-03-07 PROCEDURE — 85347 COAGULATION TIME ACTIVATED: CPT

## 2024-03-07 DEVICE — EVEROLIMUS-ELUTING PLATINUM CHROMIUM CORONARY STENT SYSTEM
Type: IMPLANTABLE DEVICE | Status: FUNCTIONAL
Brand: SYNERGY™ XD

## 2024-03-07 RX ORDER — NITROGLYCERIN 20 MG/100ML
INJECTION INTRAVENOUS CODE/TRAUMA/SEDATION MEDICATION
Status: DISCONTINUED | OUTPATIENT
Start: 2024-03-07 | End: 2024-03-07 | Stop reason: HOSPADM

## 2024-03-07 RX ORDER — LIDOCAINE HYDROCHLORIDE 10 MG/ML
INJECTION, SOLUTION EPIDURAL; INFILTRATION; INTRACAUDAL; PERINEURAL CODE/TRAUMA/SEDATION MEDICATION
Status: DISCONTINUED | OUTPATIENT
Start: 2024-03-07 | End: 2024-03-07 | Stop reason: HOSPADM

## 2024-03-07 RX ORDER — HEPARIN SODIUM 1000 [USP'U]/ML
INJECTION, SOLUTION INTRAVENOUS; SUBCUTANEOUS CODE/TRAUMA/SEDATION MEDICATION
Status: DISCONTINUED | OUTPATIENT
Start: 2024-03-07 | End: 2024-03-07 | Stop reason: HOSPADM

## 2024-03-07 RX ORDER — SODIUM CHLORIDE 9 MG/ML
75 INJECTION, SOLUTION INTRAVENOUS CONTINUOUS
Status: DISPENSED | OUTPATIENT
Start: 2024-03-07 | End: 2024-03-07

## 2024-03-07 RX ORDER — VERAPAMIL HYDROCHLORIDE 2.5 MG/ML
INJECTION, SOLUTION INTRAVENOUS CODE/TRAUMA/SEDATION MEDICATION
Status: DISCONTINUED | OUTPATIENT
Start: 2024-03-07 | End: 2024-03-07 | Stop reason: HOSPADM

## 2024-03-07 RX ORDER — ONDANSETRON 2 MG/ML
4 INJECTION INTRAMUSCULAR; INTRAVENOUS EVERY 6 HOURS PRN
Status: DISCONTINUED | OUTPATIENT
Start: 2024-03-07 | End: 2024-03-08 | Stop reason: HOSPADM

## 2024-03-07 RX ORDER — ROSUVASTATIN CALCIUM 5 MG/1
5 TABLET, COATED ORAL
Qty: 30 TABLET | Refills: 4 | Status: CANCELLED | OUTPATIENT
Start: 2024-03-07

## 2024-03-07 RX ORDER — CLOPIDOGREL BISULFATE 75 MG/1
75 TABLET ORAL DAILY
Status: DISCONTINUED | OUTPATIENT
Start: 2024-03-08 | End: 2024-03-08 | Stop reason: HOSPADM

## 2024-03-07 RX ORDER — AMLODIPINE BESYLATE 5 MG/1
5 TABLET ORAL DAILY
Status: DISCONTINUED | OUTPATIENT
Start: 2024-03-08 | End: 2024-03-08

## 2024-03-07 RX ORDER — FENTANYL CITRATE 50 UG/ML
INJECTION, SOLUTION INTRAMUSCULAR; INTRAVENOUS CODE/TRAUMA/SEDATION MEDICATION
Status: DISCONTINUED | OUTPATIENT
Start: 2024-03-07 | End: 2024-03-07 | Stop reason: HOSPADM

## 2024-03-07 RX ORDER — SODIUM CHLORIDE 9 MG/ML
80 INJECTION, SOLUTION INTRAVENOUS CONTINUOUS
Status: DISCONTINUED | OUTPATIENT
Start: 2024-03-07 | End: 2024-03-07

## 2024-03-07 RX ORDER — ASPIRIN 81 MG/1
324 TABLET, CHEWABLE ORAL ONCE
Status: COMPLETED | OUTPATIENT
Start: 2024-03-07 | End: 2024-03-07

## 2024-03-07 RX ORDER — MIDAZOLAM HYDROCHLORIDE 2 MG/2ML
INJECTION, SOLUTION INTRAMUSCULAR; INTRAVENOUS CODE/TRAUMA/SEDATION MEDICATION
Status: DISCONTINUED | OUTPATIENT
Start: 2024-03-07 | End: 2024-03-07 | Stop reason: HOSPADM

## 2024-03-07 RX ORDER — NITROGLYCERIN 0.4 MG/1
0.4 TABLET SUBLINGUAL
Status: DISCONTINUED | OUTPATIENT
Start: 2024-03-07 | End: 2024-03-08 | Stop reason: HOSPADM

## 2024-03-07 RX ORDER — CLOPIDOGREL BISULFATE 75 MG/1
75 TABLET ORAL DAILY
Qty: 30 TABLET | Refills: 11 | Status: CANCELLED | OUTPATIENT
Start: 2024-03-08

## 2024-03-07 RX ORDER — ACETAMINOPHEN 325 MG/1
650 TABLET ORAL EVERY 4 HOURS PRN
Status: DISCONTINUED | OUTPATIENT
Start: 2024-03-07 | End: 2024-03-08 | Stop reason: HOSPADM

## 2024-03-07 RX ORDER — KETOTIFEN FUMARATE 0.25 MG/ML
1 SOLUTION/ DROPS OPHTHALMIC 2 TIMES DAILY
Status: DISCONTINUED | OUTPATIENT
Start: 2024-03-07 | End: 2024-03-07 | Stop reason: ALTCHOICE

## 2024-03-07 RX ORDER — INSULIN LISPRO 100 [IU]/ML
1-5 INJECTION, SOLUTION INTRAVENOUS; SUBCUTANEOUS
Status: DISCONTINUED | OUTPATIENT
Start: 2024-03-07 | End: 2024-03-08 | Stop reason: HOSPADM

## 2024-03-07 RX ORDER — NITROGLYCERIN 0.4 MG/1
0.4 TABLET SUBLINGUAL
Qty: 30 TABLET | Refills: 1 | Status: CANCELLED | OUTPATIENT
Start: 2024-03-07

## 2024-03-07 RX ORDER — KETOTIFEN FUMARATE 0.35 MG/ML
1 SOLUTION/ DROPS OPHTHALMIC 2 TIMES DAILY
Status: DISCONTINUED | OUTPATIENT
Start: 2024-03-07 | End: 2024-03-08 | Stop reason: HOSPADM

## 2024-03-07 RX ORDER — PRASUGREL 10 MG/1
TABLET, FILM COATED ORAL CODE/TRAUMA/SEDATION MEDICATION
Status: DISCONTINUED | OUTPATIENT
Start: 2024-03-07 | End: 2024-03-07 | Stop reason: HOSPADM

## 2024-03-07 RX ORDER — ROSUVASTATIN CALCIUM 10 MG/1
5 TABLET, COATED ORAL
Status: DISCONTINUED | OUTPATIENT
Start: 2024-03-07 | End: 2024-03-08 | Stop reason: HOSPADM

## 2024-03-07 RX ADMIN — SODIUM CHLORIDE 285.9 ML: 0.9 INJECTION, SOLUTION INTRAVENOUS at 07:39

## 2024-03-07 RX ADMIN — SODIUM CHLORIDE 285.9 ML: 0.9 INJECTION, SOLUTION INTRAVENOUS at 12:14

## 2024-03-07 RX ADMIN — KETOTIFEN FUMARATE 1 DROP: 0.25 SOLUTION/ DROPS OPHTHALMIC at 12:13

## 2024-03-07 RX ADMIN — ASPIRIN 81 MG CHEWABLE TABLET 243 MG: 81 TABLET CHEWABLE at 07:37

## 2024-03-07 NOTE — DISCHARGE SUMMARY
"Discharge Summary - Pamela Bazan 73 y.o. female MRN: [id number]    Unit/Bed#: BE CATH LAB ROOM Encounter: [id number]    Admission Date: 3/7/2024   Discharge Date: 3/8/2024    Disposition: Home    Condition at Discharge: good     PCP:Demetrius Charlton MD      OP Cardiologist: Dr. Alvarez     Interventional cardiologist: Dr. Alvarez    Admitting Diagnosis:  Abnormal Lexiscan stress      Secondary Diagnoses:   History of nonobstructive coronary artery disease in 2019  Per lipidemia  Diabetes type 2  Hypertension  GERD  Obesity  CKD stage III    Discharge Diagnosis:  AD status post PCI and drug-eluting stent MARIPOSA (SYNERGY XD MR US 2.35U49EE ) x 1 to 85% lesion in the mid circumflex    Consultants: Nephrology Dr. Herrera      /74   Pulse 71   Temp (!) 96.7 °F (35.9 °C) (Temporal)   Resp 17   Ht 5' 7\" (1.702 m)   Wt 95.3 kg (210 lb)   SpO2 98%   BMI 32.89 kg/m²       Review of Systems   All other systems reviewed and are negative.      Physical Exam  Constitutional:       Appearance: Normal appearance.   HENT:      Head: Normocephalic and atraumatic.   Cardiovascular:      Rate and Rhythm: Normal rate.      Pulses: Normal pulses.      Heart sounds: Normal heart sounds.   Pulmonary:      Effort: Pulmonary effort is normal.      Breath sounds: Normal breath sounds.   Neurological:      Mental Status: She is alert.       HPI and Hospital Course: 89-year-old female with history of CAD (nonobstructive, 2019, hyperlipidemia, hypertension, diabetes type 2, GERD, and obesity.  She saw Dr. Jiang in November 2023 for chest pain.  She complained of pain as rest occurred at rest and with exertion.  Patient underwent a pharmacological stress test which revealed which was positive for fixed anterior infarct with roxanna-infarct ischemia.    Patient has been on multiple oral statin with multiple complaints and has discontinued the use.  He was placed on Praluent which she complained of constipation and discontinued the " medication.  She also had reaction to metoprolol in regards to lightheadedness and increased depression.  In that she was self discontinued.    Patient was electively admitted to cardiac catheterization lab to evaluate her coronary anatomy and ischemic burden.    Results of the catheter as follows:    Proximal LAD has a 35% stenosis HINA-3 flow.  Mid circumflex had an 85% lesion and was the culprit lesion for her anginal symptoms with HINA flow 3.  Right coronary artery had a 20% proximal lesion with HINA-3 flow    Patient went underwent a PCI and drug-eluting stent (SYNERGY XD MR US 2.65G26KD ) x 1 to 85% lesion in the mid circumflex.    Right radial artery access was utilized for the procedure.. Radial pulse +2 and has good circulation and function to hand.    Patient is being discharged to home on aspirin 81 mg daily, Plavix 75 mg and resume her Repatha.    Patient cannot tolerate beta-blockers to depression, lightheadedness and dizziness.  In addition the patient is not an MI.  Patient with stable ischemic coronary artery disease who underwent an elective PCI.  Does not benefit from beta-blocker based on ACC guideline for stable ischemic heart disease (SIHD).    Cardiac rehab referral will be made.    BMP be checked on Monday evaluate her renal function..      Current Facility-Administered Medications   Medication Dose Route Frequency    acetaminophen (TYLENOL) tablet 650 mg  650 mg Oral Q4H PRN    [START ON 3/8/2024] amLODIPine (NORVASC) tablet 5 mg  5 mg Oral Daily    [START ON 3/8/2024] aspirin (ECOTRIN LOW STRENGTH) EC tablet 81 mg  81 mg Oral Daily    [START ON 3/8/2024] clopidogrel (PLAVIX) tablet 75 mg  75 mg Oral Daily    insulin lispro (HumALOG/ADMELOG) 100 units/mL subcutaneous injection 1-5 Units  1-5 Units Subcutaneous TID AC    ketotifen (ZADITOR) 0.025 % ophthalmic solution 1 drop  1 drop Both Eyes BID    nitroglycerin (NITROSTAT) SL tablet 0.4 mg  0.4 mg Sublingual Q5 Min PRN    ondansetron  "(ZOFRAN) injection 4 mg  4 mg Intravenous Q6H PRN    rosuvastatin (CRESTOR) tablet 5 mg  5 mg Oral Daily With Dinner    sodium chloride 0.9 % infusion  75 mL/hr Intravenous Continuous       Pertinent Labs/diagnostics:        Lab Ressults:  Recent Results (from the past 24 hour(s))   ECG 12 lead    Collection Time: 03/07/24  7:12 AM   Result Value Ref Range    Ventricular Rate 80 BPM    Atrial Rate 80 BPM    LA Interval 132 ms    QRSD Interval 76 ms    QT Interval 382 ms    QTC Interval 440 ms    P Tiff 64 degrees    QRS Axis -2 degrees    T Wave Axis 8 degrees   Basic metabolic panel    Collection Time: 03/07/24  7:38 AM   Result Value Ref Range    Sodium 142 135 - 147 mmol/L    Potassium 4.3 3.5 - 5.3 mmol/L    Chloride 105 96 - 108 mmol/L    CO2 27 21 - 32 mmol/L    ANION GAP 10 mmol/L    BUN 11 5 - 25 mg/dL    Creatinine 0.98 0.60 - 1.30 mg/dL    Glucose 114 65 - 140 mg/dL    Glucose, Fasting 114 (H) 65 - 99 mg/dL    Calcium 9.0 8.4 - 10.2 mg/dL    eGFR 56 ml/min/1.73sq m   Fingerstick Glucose (POCT)    Collection Time: 03/07/24  1:24 PM   Result Value Ref Range    POC Glucose 137 65 - 140 mg/dl       Lipid Profile:   No results found for: \"CHOL\"  No results found for: \"HDL\"  No results found for: \"LDLCALC\"  No results found for: \"TRIG\"      Cardiac testing:   No results found for this or any previous visit.    No results found for this or any previous visit.    No valid procedures specified.  No results found for this or any previous visit.      Tele:NSR      Discharge instructions/Information to patient and family:   See after visit summary for information provided to patient and family.      Provisions for Follow-Up Care:  See after visit summary for information related to follow-up care and any pertinent home health orders.      Planned Readmission: no    Discharge Statement:  I spent 1 hour minutes discharging the patient. This time was spent on the day of discharge. I had direct contact with the patient on " the day of discharge. Additional documentation is required if more than 30 minutes were spent on discharge.         ** Please Note: [employer] Dictation voice to text software may have been used in the creation of this document. **

## 2024-03-07 NOTE — INTERVAL H&P NOTE
"H&P reviewed. After examining the patient, I find no changed to the H&P since it had been written.    73F w/ nonsmoker, CAD (nonobstructive, 2019), HLD, HTN, DMII, GERD, and Obesity p/w CP (resting and exertional) after trauma and HERNANDEZ and abnormal Lexiscan (anterior infarct) is referred for LHC +/- PCI.     /74 (BP Location: Right arm)   Pulse 85   Temp 97.8 °F (36.6 °C) (Oral)   Resp 16   Ht 5' 7\" (1.702 m)   Wt 95.3 kg (210 lb)   SpO2 98%   BMI 32.89 kg/m²      Patient re-evaluated. Accept as history and physical.    Alexis Maloney, DO/March 7, 2024/7:37 AM    "

## 2024-03-07 NOTE — Clinical Note
The ECG shows a sinus rhythm. How Did The Hair Loss Occur?: sudden in onset How Severe Is Your Hair Loss?: moderate

## 2024-03-07 NOTE — PLAN OF CARE
Problem: PAIN - ADULT  Goal: Verbalizes/displays adequate comfort level or baseline comfort level  Description: Interventions:  - Encourage patient to monitor pain and request assistance  - Assess pain using appropriate pain scale  - Administer analgesics based on type and severity of pain and evaluate response  - Implement non-pharmacological measures as appropriate and evaluate response  - Consider cultural and social influences on pain and pain management  - Notify physician/advanced practitioner if interventions unsuccessful or patient reports new pain  Outcome: Progressing     Problem: INFECTION - ADULT  Goal: Absence or prevention of progression during hospitalization  Description: INTERVENTIONS:  - Assess and monitor for signs and symptoms of infection  - Monitor lab/diagnostic results  - Monitor all insertion sites, i.e. indwelling lines, tubes, and drains  - Monitor endotracheal if appropriate and nasal secretions for changes in amount and color  - [place] appropriate cooling/warming therapies per order  - Administer medications as ordered  - Instruct and encourage patient and family to use good hand hygiene technique  - Identify and instruct in appropriate isolation precautions for identified infection/condition  Outcome: Progressing      2 weeks

## 2024-03-07 NOTE — DISCHARGE INSTR - AVS FIRST PAGE
1. Please see the post angioplasty discharge instructions.   No heavy lifting, greater than 10 lbs. or strenuous activity for 5 days.  Follow angioplasty discharge instructions.    2.Remove band aid tomorrow.  Shower and wash area- wrist gently with soap and water- beginning tomorrow. Rinse and pat dry.  Apply new water seal band aid.  Repeat this process for 5 days. No powders, creams lotions or antibiotic ointments  for 5 days.  No tub baths, hot tubs or swimming for 5 days.     3. Call [facility]'s Cardiology Office ([phone]) if you develop a fever, redness or drainage at your wrist access site.      4. No driving for 2 days    5. Do not stop aspirin or Plavix (clopiogrel) any reason without a cardiologist’s consent, or the stent could block up and cause a heart attack.    6. Stent card and book.

## 2024-03-07 NOTE — CONSULTS
Consultation - Nephrology   Kacey Bazan 73 y.o. female MRN: [id number]  Unit/Bed#: BE CATH LAB ROOM Encounter: [id number]    ASSESSMENT AND PLAN:  Patient is 73-year-old female with significant medical issues of hypertension for 2 to 3 years, hyperlipidemia, diabetes for 5 years, obesity, CKD stage III, presented for elective cardiac cath.  We are consulted for CKD management and renal optimization.    CKD stage III, baseline creatinine 1-1.1 going back to 2022, isolated creatinine 1.3 in May 2022 noted  -Creatinine 0.9 today stable at baseline.  -I had detailed discussion with patient regarding risk of YUAN with contrast exposure with remote possibility of dialysis.  -CKD suspect in the setting of underlying hypertension, diabetes, age-related nephron loss  -Agree with IV normal saline 3 mill per kilogram over 1 hour and then continue at 80 mill per hour for at least 4 hours postcontrast exposure.  -If patient gets discharged, would recommend repeat BMP in 2 days  -Eventually consider UA with microscopy which can be done as an outpatient.    Hypertension, outpatient regimen includes amlodipine 5 mg daily  -Blood pressure acceptable  -Currently not on any antihypertensive medications.  May restart amlodipine upon discharge or if BP remains persistently greater than 140/90 with holding parameter.    Intermittent chest discomfort, suspected cardiac etiology, patient presented for elective cardiac cath which is planned today.  Cardiology follow-up    Discussed above plan in detail with primary team regarding IV fluid prep pre and postcontrast exposure as above, monitoring BMP and they agree with above recommendations.      HISTORY OF PRESENT ILLNESS:  Requesting Physician: Valerie Alvarez DO  Reason for Consult: CKD    Kacey Bazan is a 73 y.o. year old female who was admitted to [facility] after presenting with elective cardiac cath. A renal consultation is requested today for assistance in the  management of CKD.  She denies any nausea or vomiting.  No recent NSAID exposure.  She does not take any diuretics at home.  Due to intermittent chest discomfort issues she is being closely followed by cardiology as outpatient and was brought to the hospital for elective cardiac cath.    Denies any urinary complaint.  Creatinine seems to be 1-1.1 at baseline.  Creatinine remained stable today.    PAST MEDICAL HISTORY:  No past medical history on file.  Hypertension, hyperlipidemia, diabetes, CKD    PAST SURGICAL HISTORY:  No past surgical history on file.    ALLERGIES:  Allergies   Allergen Reactions    Penicillins Hives and Swelling     Tongue swelling    Simvastatin Myalgia    Metoprolol Dizziness and Confusion     Sweating, cold hands and feet.     Praluent [Alirocumab] Dizziness     Bruising      Lisinopril Cough       SOCIAL HISTORY:  Social History     Substance and Sexual Activity   Alcohol Use Not on file     Social History     Substance and Sexual Activity   Drug Use Not on file     Social History     Tobacco Use   Smoking Status Not on file   Smokeless Tobacco Not on file       FAMILY HISTORY:  No family history on file.    MEDICATIONS:    Current Facility-Administered Medications:     sodium chloride 0.9 % bolus 285.9 mL, 3 mL/kg, Intravenous, Once, Last Rate: 143 mL/hr at 03/07/24 0739, 285.9 mL at 03/07/24 0739 **FOLLOWED BY** sodium chloride 0.9 % infusion, 80 mL/hr, Intravenous, Continuous, Shalom Herrera MD    REVIEW OF SYSTEMS:  More than 10 point review of systems were obtained and discussed in length with the patient. Review of systems were negative / unremarkable except mentioned in the HPI section.     PHYSICAL EXAM:  Current Weight: Weight - Scale: 95.3 kg (210 lb)  First Weight: Weight - Scale: 95.3 kg (210 lb)  Vitals:    03/07/24 0723   BP: 131/74   Pulse: 85   Resp: 16   Temp: 97.8 °F (36.6 °C)   SpO2: 98%     No intake or output data in the 24 hours ending 03/07/24 0917  Wt Readings from  "Last 3 Encounters:   03/07/24 95.3 kg (210 lb)   02/08/24 95.3 kg (210 lb)   12/07/23 97.2 kg (214 lb 4.8 oz)     Temp Readings from Last 3 Encounters:   03/07/24 97.8 °F (36.6 °C) (Oral)     BP Readings from Last 3 Encounters:   03/07/24 131/74   02/08/24 148/86   12/07/23 160/84     Pulse Readings from Last 3 Encounters:   03/07/24 85   02/08/24 83   12/07/23 80        Physical Examination:  Eyes: No conjunctival pallor present  ENT: External examination of ear and nose unremarkable  Neck: No obvious lymphadenopathy appreciated  Respiratory: Bilateral air entry present  CVS: No significant edema in legs  GI: Soft, nondistended  CNS: Active, alert, oriented  Skin: No new rash  Musculoskeletal: No obvious new gross deformity noted    Invasive Devices:      Lab Results:   Results from last 7 days   Lab Units 03/07/24  0738   POTASSIUM mmol/L 4.3   CHLORIDE mmol/L 105   CO2 mmol/L 27   BUN mg/dL 11   CREATININE mg/dL 0.98   CALCIUM mg/dL 9.0       Other Studies:   No orders to display       Portions of the record may have been created with voice recognition software. Occasional wrong word or \"sound a like\" substitutions may have occurred due to the inherent limitations of voice recognition software. Read the chart carefully and recognize, using context, where substitutions have occurred.    "

## 2024-03-08 VITALS
SYSTOLIC BLOOD PRESSURE: 146 MMHG | BODY MASS INDEX: 32.96 KG/M2 | HEIGHT: 67 IN | HEART RATE: 71 BPM | DIASTOLIC BLOOD PRESSURE: 83 MMHG | RESPIRATION RATE: 15 BRPM | OXYGEN SATURATION: 96 % | TEMPERATURE: 97.1 F | WEIGHT: 210 LBS

## 2024-03-08 LAB
ANION GAP SERPL CALCULATED.3IONS-SCNC: 7 MMOL/L
AORTIC ROOT: 2.9 CM
AORTIC VALVE MEAN VELOCITY: 9.7 M/S
APICAL FOUR CHAMBER EJECTION FRACTION: 57 %
ASCENDING AORTA: 3.1 CM
AV LVOT MEAN GRADIENT: 1 MMHG
AV LVOT PEAK GRADIENT: 2 MMHG
AV MEAN GRADIENT: 4 MMHG
AV PEAK GRADIENT: 8 MMHG
AV VELOCITY RATIO: 0.55
BSA FOR ECHO PROCEDURE: 2.06 M2
BUN SERPL-MCNC: 9 MG/DL (ref 5–25)
CALCIUM SERPL-MCNC: 8.9 MG/DL (ref 8.4–10.2)
CHLORIDE SERPL-SCNC: 107 MMOL/L (ref 96–108)
CO2 SERPL-SCNC: 26 MMOL/L (ref 21–32)
CREAT SERPL-MCNC: 0.85 MG/DL (ref 0.6–1.3)
DOP CALC AO PEAK VEL: 1.39 M/S
DOP CALC AO VTI: 28.28 CM
DOP CALC LVOT PEAK VEL VTI: 16.9 CM
DOP CALC LVOT PEAK VEL: 0.77 M/S
E WAVE DECELERATION TIME: 242 MS
E/A RATIO: 0.6
ERYTHROCYTE [DISTWIDTH] IN BLOOD BY AUTOMATED COUNT: 14.1 % (ref 11.6–15.1)
FRACTIONAL SHORTENING: 29 (ref 28–44)
GFR SERPL CREATININE-BSD FRML MDRD: 67 ML/MIN/1.73SQ M
GLUCOSE P FAST SERPL-MCNC: 93 MG/DL (ref 65–99)
GLUCOSE SERPL-MCNC: 108 MG/DL (ref 65–140)
GLUCOSE SERPL-MCNC: 93 MG/DL (ref 65–140)
HCT VFR BLD AUTO: 33.5 % (ref 34.8–46.1)
HGB BLD-MCNC: 11.1 G/DL (ref 11.5–15.4)
INTERVENTRICULAR SEPTUM IN DIASTOLE (PARASTERNAL SHORT AXIS VIEW): 1.2 CM
INTERVENTRICULAR SEPTUM: 1.2 CM (ref 0.6–1.1)
LAAS-AP2: 19.6 CM2
LAAS-AP4: 18.9 CM2
LEFT ATRIUM SIZE: 3.4 CM
LEFT ATRIUM VOLUME (MOD BIPLANE): 57 ML
LEFT ATRIUM VOLUME INDEX (MOD BIPLANE): 27.7 ML/M2
LEFT INTERNAL DIMENSION IN SYSTOLE: 3.2 CM (ref 2.1–4)
LEFT VENTRICLE DIASTOLIC VOLUME (MOD BIPLANE): 45 ML
LEFT VENTRICLE DIASTOLIC VOLUME INDEX (MOD BIPLANE): 21.8 ML/M2
LEFT VENTRICLE SYSTOLIC VOLUME (MOD BIPLANE): 20 ML
LEFT VENTRICLE SYSTOLIC VOLUME INDEX (MOD BIPLANE): 9.7 ML/M2
LEFT VENTRICULAR INTERNAL DIMENSION IN DIASTOLE: 4.5 CM (ref 3.5–6)
LEFT VENTRICULAR POSTERIOR WALL IN END DIASTOLE: 1.1 CM
LEFT VENTRICULAR STROKE VOLUME: 52 ML
LV EF: 56 %
LVSV (TEICH): 52 ML
MCH RBC QN AUTO: 29.5 PG (ref 26.8–34.3)
MCHC RBC AUTO-ENTMCNC: 33.1 G/DL (ref 31.4–37.4)
MCV RBC AUTO: 89 FL (ref 82–98)
MV E'TISSUE VEL-SEP: 6 CM/S
MV PEAK A VEL: 0.89 M/S
MV PEAK E VEL: 53 CM/S
MV STENOSIS PRESSURE HALF TIME: 70 MS
MV VALVE AREA P 1/2 METHOD: 3.14
PLATELET # BLD AUTO: 297 THOUSANDS/UL (ref 149–390)
PMV BLD AUTO: 9.4 FL (ref 8.9–12.7)
POTASSIUM SERPL-SCNC: 4.2 MMOL/L (ref 3.5–5.3)
RA PRESSURE ESTIMATED: 3 MMHG
RBC # BLD AUTO: 3.76 MILLION/UL (ref 3.81–5.12)
RIGHT ATRIUM AREA SYSTOLE A4C: 12.5 CM2
RIGHT VENTRICLE ID DIMENSION: 3 CM
SL CV LEFT ATRIUM LENGTH A2C: 5.5 CM
SL CV LV EF: 50
SL CV PED ECHO LEFT VENTRICLE DIASTOLIC VOLUME (MOD BIPLANE) 2D: 92 ML
SL CV PED ECHO LEFT VENTRICLE SYSTOLIC VOLUME (MOD BIPLANE) 2D: 41 ML
SODIUM SERPL-SCNC: 140 MMOL/L (ref 135–147)
TRICUSPID ANNULAR PLANE SYSTOLIC EXCURSION: 2 CM
WBC # BLD AUTO: 6.79 THOUSAND/UL (ref 4.31–10.16)

## 2024-03-08 PROCEDURE — 82948 REAGENT STRIP/BLOOD GLUCOSE: CPT

## 2024-03-08 PROCEDURE — 85027 COMPLETE CBC AUTOMATED: CPT | Performed by: INTERNAL MEDICINE

## 2024-03-08 PROCEDURE — 80048 BASIC METABOLIC PNL TOTAL CA: CPT | Performed by: INTERNAL MEDICINE

## 2024-03-08 RX ORDER — AMLODIPINE BESYLATE 5 MG/1
5 TABLET ORAL DAILY
Status: DISCONTINUED | OUTPATIENT
Start: 2024-03-09 | End: 2024-03-08 | Stop reason: HOSPADM

## 2024-03-08 RX ORDER — CLOPIDOGREL BISULFATE 75 MG/1
75 TABLET ORAL DAILY
Qty: 30 TABLET | Refills: 11 | Status: SHIPPED | OUTPATIENT
Start: 2024-03-09

## 2024-03-08 RX ORDER — NITROGLYCERIN 0.4 MG/1
0.4 TABLET SUBLINGUAL
Qty: 30 TABLET | Refills: 1 | Status: SHIPPED | OUTPATIENT
Start: 2024-03-08

## 2024-03-08 RX ADMIN — ASPIRIN 81 MG: 81 TABLET, COATED ORAL at 08:50

## 2024-03-08 RX ADMIN — AMLODIPINE BESYLATE 5 MG: 5 TABLET ORAL at 08:50

## 2024-03-08 RX ADMIN — CLOPIDOGREL BISULFATE 75 MG: 75 TABLET ORAL at 08:50

## 2024-03-08 NOTE — PROGRESS NOTES
Walked pt around unit on RA. Pt went as low as 88% on RA. Pt denied feeling short of breath. As we continued walking pt SPO2 went to 92%.

## 2024-03-08 NOTE — PLAN OF CARE
Problem: PAIN - ADULT  Goal: Verbalizes/displays adequate comfort level or baseline comfort level  Description: Interventions:  - Encourage patient to monitor pain and request assistance  - Assess pain using appropriate pain scale  - Administer analgesics based on type and severity of pain and evaluate response  - Implement non-pharmacological measures as appropriate and evaluate response  - Consider cultural and social influences on pain and pain management  - Notify physician/advanced practitioner if interventions unsuccessful or patient reports new pain  Outcome: Progressing     Problem: INFECTION - ADULT  Goal: Absence or prevention of progression during hospitalization  Description: INTERVENTIONS:  - Assess and monitor for signs and symptoms of infection  - Monitor lab/diagnostic results  - Monitor all insertion sites, i.e. indwelling lines, tubes, and drains  - Monitor endotracheal if appropriate and nasal secretions for changes in amount and color  - [place] appropriate cooling/warming therapies per order  - Administer medications as ordered  - Instruct and encourage patient and family to use good hand hygiene technique  - Identify and instruct in appropriate isolation precautions for identified infection/condition  Outcome: Progressing  Goal: Absence of fever/infection during neutropenic period  Description: INTERVENTIONS:  - Monitor WBC    Outcome: Progressing

## 2024-03-08 NOTE — PLAN OF CARE
Problem: PAIN - ADULT  Goal: Verbalizes/displays adequate comfort level or baseline comfort level  Description: Interventions:  - Encourage patient to monitor pain and request assistance  - Assess pain using appropriate pain scale  - Administer analgesics based on type and severity of pain and evaluate response  - Implement non-pharmacological measures as appropriate and evaluate response  - Consider cultural and social influences on pain and pain management  - Notify physician/advanced practitioner if interventions unsuccessful or patient reports new pain  Outcome: Progressing     Problem: INFECTION - ADULT  Goal: Absence or prevention of progression during hospitalization  Description: INTERVENTIONS:  - Assess and monitor for signs and symptoms of infection  - Monitor lab/diagnostic results  - Monitor all insertion sites, i.e. indwelling lines, tubes, and drains  - Monitor endotracheal if appropriate and nasal secretions for changes in amount and color  - [place] appropriate cooling/warming therapies per order  - Administer medications as ordered  - Instruct and encourage patient and family to use good hand hygiene technique  - Identify and instruct in appropriate isolation precautions for identified infection/condition  Outcome: Progressing     Problem: SAFETY ADULT  Goal: Maintain or return to baseline ADL function  Description: INTERVENTIONS:  -  Assess patient's ability to carry out ADLs; assess patient's baseline for ADL function and identify physical deficits which impact ability to perform ADLs (bathing, care of mouth/teeth, toileting, grooming, dressing, etc.)  - Assess/evaluate cause of self-care deficits   - Assess range of motion  - Assess patient's mobility; develop plan if impaired  - Assess patient's need for assistive devices and provide as appropriate  - Encourage maximum independence but intervene and supervise when necessary  - Involve family in performance of ADLs  - Assess for home care  needs following discharge   - Consider OT consult to assist with ADL evaluation and planning for discharge  - Provide patient education as appropriate  Outcome: Progressing

## 2024-03-08 NOTE — PROGRESS NOTES
NEPHROLOGY PROGRESS NOTE   Pamela Bazan 73 y.o. female MRN: [id number]  Unit/Bed#: -01 Encounter: [id number]  Reason for Consult: CKD    ASSESSMENT AND PLAN:  Patient is 73-year-old female with significant medical issues of hypertension for 2 to 3 years, hyperlipidemia, diabetes for 5 years, obesity, CKD stage III, presented for elective cardiac cath.  We are consulted for CKD management and renal optimization.     CKD stage III, baseline creatinine 1-1.1 going back to 2022, isolated creatinine 1.3 in May 2022 noted  -Creatinine 0.8 today stable.  -CKD suspect in the setting of underlying hypertension, diabetes, age-related nephron loss  -Eventually consider UA with microscopy which can be done as an outpatient.  -Now remains off IV fluid.  -Status post cardiac cath on 3/7/2024, status post PCI, monitor for LIZBETH.  If patient gets discharged, would recommend repeat BMP on Monday.     Hypertension, outpatient regimen includes amlodipine 5 mg daily  -Blood pressure acceptable, occasional low normal readings noted yesterday.  Change holding parameter for amlodipine.     CAD, status post PCI this admission.  Cardiology follow-up.     Discussed above plan in detail with primary team regarding monitoring BMP, if gets discharged, would recommend repeat BMP early next week and they agree with above recommendations.      SUBJECTIVE:  Patient seen and examined at bedside.  Denies any nausea, vomiting, worsening shortness of breath.    OBJECTIVE:  Current Weight: Weight - Scale: 95.3 kg (210 lb)  Vitals:    03/08/24 0730   BP: 146/83   Pulse: 71   Resp: 15   Temp: (!) 97.1 °F (36.2 °C)   SpO2: 96%       Intake/Output Summary (Last 24 hours) at 3/8/2024 1232  Last data filed at 3/8/2024 0900  Gross per 24 hour   Intake 460.75 ml   Output 0 ml   Net 460.75 ml     Wt Readings from Last 3 Encounters:   03/07/24 95.3 kg (210 lb)   02/08/24 95.3 kg (210 lb)   12/07/23 97.2 kg (214 lb 4.8 oz)     Temp Readings from Last 3  Encounters:   03/08/24 (!) 97.1 °F (36.2 °C) (Oral)     BP Readings from Last 3 Encounters:   03/08/24 146/83   02/08/24 148/86   12/07/23 160/84     Pulse Readings from Last 3 Encounters:   03/08/24 71   02/08/24 83   12/07/23 80        Physical Examination:  Eyes: Mild conjunctival pallor present  Neck: No obvious lymphadenopathy appreciated  Respiratory: Bilateral air entry present  CVS: No significant edema  GI: Soft, nondistended  CNS: Active, alert, follows commands  Skin: No new rash  Musculoskeletal: No obvious new gross deformity noted    Medications:    Current Facility-Administered Medications:     acetaminophen (TYLENOL) tablet 650 mg, 650 mg, Oral, Q4H PRN, DAMIAN Murray    amLODIPine (NORVASC) tablet 5 mg, 5 mg, Oral, Daily, DAMIAN Murray, 5 mg at 03/08/24 0850    aspirin (ECOTRIN LOW STRENGTH) EC tablet 81 mg, 81 mg, Oral, Daily, DAMIAN Murray, 81 mg at 03/08/24 0850    clopidogrel (PLAVIX) tablet 75 mg, 75 mg, Oral, Daily, DAMIAN Murray, 75 mg at 03/08/24 0850    insulin lispro (HumALOG/ADMELOG) 100 units/mL subcutaneous injection 1-5 Units, 1-5 Units, Subcutaneous, TID AC **AND** Fingerstick Glucose (POCT), , , TID AC, DAMIAN Murray    Ketotifen Fumarate (ZADITOR) 0.035 % ophthalmic solution 1 drop, 1 drop, Both Eyes, BID, DAMIAN Murray    nitroglycerin (NITROSTAT) SL tablet 0.4 mg, 0.4 mg, Sublingual, Q5 Min PRN, DAMIAN Murray    ondansetron (ZOFRAN) injection 4 mg, 4 mg, Intravenous, Q6H PRN, DAMIAN Murray    rosuvastatin (CRESTOR) tablet 5 mg, 5 mg, Oral, Daily With Dinner, DAMIAN Murray    Laboratory Results:  Results from last 7 days   Lab Units 03/08/24  0539 03/07/24  0738   WBC Thousand/uL 6.79  --    HEMOGLOBIN g/dL 11.1*  --    HEMATOCRIT % 33.5*  --    PLATELETS Thousands/uL 297  --    SODIUM mmol/L 140 142   POTASSIUM mmol/L 4.2 4.3   CHLORIDE mmol/L 107 105   CO2 mmol/L 26 27   BUN mg/dL 9 11   CREATININE mg/dL 0.85 0.98   CALCIUM mg/dL 8.9  "9.0       No orders to display       Portions of the record may have been created with voice recognition software. Occasional wrong word or \"sound a like\" substitutions may have occurred due to the inherent limitations of voice recognition software. Read the chart carefully and recognize, using context, where substitutions have occurred.    "

## 2024-03-11 ENCOUNTER — TELEPHONE (OUTPATIENT)
Dept: NEPHROLOGY | Facility: CLINIC | Age: 74
End: 2024-03-11

## 2024-03-11 DIAGNOSIS — N18.30 STAGE 3 CHRONIC KIDNEY DISEASE, UNSPECIFIED WHETHER STAGE 3A OR 3B CKD (HCC): Primary | ICD-10-CM

## 2024-03-11 NOTE — TELEPHONE ENCOUNTER
----- Message from Shalom Herrera MD sent at 3/8/2024  5:28 PM EST -----  She got discharged from [facility] today.  She needs repeat BMP early next week and office follow-up with [facility] in 4 months

## 2024-03-12 ENCOUNTER — TELEPHONE (OUTPATIENT)
Dept: CARDIOLOGY CLINIC | Facility: CLINIC | Age: 74
End: 2024-03-12

## 2024-03-13 NOTE — TELEPHONE ENCOUNTER
Per cover my meds repatha has been approved, waiting on fax for coverage dates.  [facility] pharmacy notified.

## 2024-03-28 ENCOUNTER — CLINICAL SUPPORT (OUTPATIENT)
Dept: CARDIAC REHAB | Age: 74
End: 2024-03-28

## 2024-03-28 DIAGNOSIS — Z95.5 S/P DRUG ELUTING CORONARY STENT PLACEMENT: ICD-10-CM

## 2024-03-28 NOTE — PROGRESS NOTES
"CARDIAC REHAB ASSESSMENT    Today's date: 2024  Patient name: Pamela Bazan     : 1950       MRN: [id number]  PCP: Demetrius Charlton MD  Referring Physician: Valerie Alvarez DO  Cardiologist: ***  Surgeon: ***  Dx:   Encounter Diagnosis   Name Primary?    S/P drug eluting coronary stent placement        Date of onset: ***  Cultural needs: ***    Weight    Wt Readings from Last 1 Encounters:   24 95.3 kg (210 lb)      Height:   Ht Readings from Last 1 Encounters:   24 5' 7\" (1.702 m)     Medical History:   Past Medical History:   Diagnosis Date    CAD (coronary artery disease) 2024         Physical Limitations: ***    Fall Risk: {Fall Risk:75229}   Comments: {FALL RISK FOR CR/NJ:76780}    Anginal Equivalent: {Anginal Equivalent:56608}   NTG use: {NTG Use:37580}    Risk Factors   Cholesterol: {Risk Factor yes/no:83799}  Smoking: {Tobacco Use:55382}  HTN: {Risk Factor yes/no:57910}  DM: {Diabetes history:94955}  Obesity: {Risk Factor yes/no:34726}   Inactivity: {Risk Factor yes/no:32598}  Stress:  perceived  stress: ***/10   Stressors:***   Goals for Stress Management:{GOALS FOR STRESS :55102}    Family History:No family history on file.    Allergies: Penicillins, Simvastatin, Metoprolol, Praluent [alirocumab], and Lisinopril    ETOH:   Social History     Substance and Sexual Activity   Alcohol Use Not Currently       Current Medications:   Current Outpatient Medications   Medication Sig Dispense Refill    amLODIPine (NORVASC) 5 mg tablet Take 5 mg by mouth daily      aspirin (ECOTRIN LOW STRENGTH) 81 mg EC tablet Take 81 mg by mouth daily      clopidogrel (PLAVIX) 75 mg tablet Take 1 tablet (75 mg total) by mouth daily 30 tablet 11    fluticasone (FLONASE) 50 mcg/act nasal spray 2 sprays into each nostril if needed      metFORMIN (GLUCOPHAGE) 500 mg tablet Take 500 mg by mouth in the morning      nitroglycerin (NITROSTAT) 0.4 mg SL tablet Place 1 tablet (0.4 mg total) under " the tongue every 5 (five) minutes as needed for chest pain 30 tablet 1    olopatadine HCl (PATADAY) 0.2 % opth drops 1 drop      omeprazole (PriLOSEC OTC) 20 MG tablet Take 20 mg by mouth if needed      OneTouch Verio test strip TEST TWICE A DAY      Repatha 140 MG/ML SOSY INJECT 1 ML UNDER TH SKIN EVERY 14 DAYS FOR 3 DOSES 3 mL 0     No current facility-administered medications for this visit.         Functional Status Prior to Diagnosis for Treatment   Occupation: {Occupation:41910}  Recreation: ***  ADL’s: {functional status:55280}  [place]: {functional status:12313}  Exercise: ***  Other: ***    Current Functional Status  Occupation: {Occupation:38496}  Recreation: ***  ADL’s:{functional status:09690}  [place]: {functional status:94471}  Exercise: ***  Home exercise equipment: ***  Other: ***      Patient Specific Goals:     EXERCISE GOALS (home exercise, ADLs):   ***  ***   NUTRITION GOALS (wt control, diabetes management, dietary modifications):   ***  ***   PSYCHOCOSOCIAL GOALS (stress, emotional well being, social support):   ***  ***   CORE COMPONENT GOALS (smoking, BP control, angina control, medication):   ***  ***       Ability to reach goals/rehabilitation potential:  {Potential to Reach Goals:88593}    Projected return to function: {Return to function:63919}  Objective tests: {Objective tests:29573}      Nutritional   Reviewed details of Rate your Plate. Discussed key elements of heart healthy eating. Reviewed patient goals for dietary modifications and their clinical implications.  Reviewed most recent lipid profile.     Goals for dietary modification (based on Rate Your Plate Dietary Assessment)   {Nutrition Goal:62693}    Psychosocial Assessment as it relates to rehabilitation  Are there any aspects of the patient's family and home situation that will affect their rehabilitation?  {Yes ... No:95441}    Assessment of depression and anxiety   {emotinal/social:54678}    Psychosocial  Evaluation    PHQ-9: ***  {PHQ9:14282}  NAIMA-7: ***  {NAIMA-7:57383}       Marital status: {CR Marital Status:29927}  Social Support: {SOCIAL SUPPORT NETWORK:00563}    Domestic Violence Screening: {1-5:8306163827}      REPORT OF CURRENT CARDIAC EVENT: ***    OTHER CARDIAC HISTORY: ***    COMMENTS: ***

## 2024-04-01 ENCOUNTER — TELEPHONE (OUTPATIENT)
Dept: CARDIOLOGY CLINIC | Facility: CLINIC | Age: 74
End: 2024-04-01

## 2024-04-01 DIAGNOSIS — Z95.5 S/P DRUG ELUTING CORONARY STENT PLACEMENT: Primary | ICD-10-CM

## 2024-04-01 NOTE — TELEPHONE ENCOUNTER
Celsa from [facility] called and pt is asking to do Cardiac rehab closer to home.  Faxed Face sheet, Cath report, H/P from hospital and LOV note to Celsa # [phone].      Will get new order for cardiac rehab signed by Dr. Alvarez.

## 2024-04-03 ENCOUNTER — TELEPHONE (OUTPATIENT)
Dept: CARDIOLOGY CLINIC | Facility: CLINIC | Age: 74
End: 2024-04-03

## 2024-04-03 NOTE — TELEPHONE ENCOUNTER
Routed Cardiac rehab order to Lolly from [facility]. # [phone].

## 2024-04-11 ENCOUNTER — OFFICE VISIT (OUTPATIENT)
Age: 74
End: 2024-04-11

## 2024-04-11 ENCOUNTER — VBI (OUTPATIENT)
Dept: ADMINISTRATIVE | Facility: OTHER | Age: 74
End: 2024-04-11

## 2024-04-11 VITALS
OXYGEN SATURATION: 98 % | WEIGHT: 215 LBS | HEART RATE: 70 BPM | TEMPERATURE: 98.4 F | BODY MASS INDEX: 33.67 KG/M2 | RESPIRATION RATE: 16 BRPM | SYSTOLIC BLOOD PRESSURE: 128 MMHG | DIASTOLIC BLOOD PRESSURE: 72 MMHG

## 2024-04-11 DIAGNOSIS — M54.50 ACUTE RIGHT-SIDED LOW BACK PAIN WITHOUT SCIATICA: ICD-10-CM

## 2024-04-11 DIAGNOSIS — E78.5 HYPERLIPIDEMIA, UNSPECIFIED HYPERLIPIDEMIA TYPE: ICD-10-CM

## 2024-04-11 DIAGNOSIS — E11.22 TYPE 2 DIABETES MELLITUS WITH STAGE 3A CHRONIC KIDNEY DISEASE, WITHOUT LONG-TERM CURRENT USE OF INSULIN (HCC): ICD-10-CM

## 2024-04-11 DIAGNOSIS — I25.10 CORONARY ARTERY DISEASE INVOLVING NATIVE CORONARY ARTERY OF NATIVE HEART WITHOUT ANGINA PECTORIS: Primary | ICD-10-CM

## 2024-04-11 DIAGNOSIS — N18.31 TYPE 2 DIABETES MELLITUS WITH STAGE 3A CHRONIC KIDNEY DISEASE, WITHOUT LONG-TERM CURRENT USE OF INSULIN (HCC): ICD-10-CM

## 2024-04-11 PROBLEM — T46.6X5A ADVERSE EFFECT OF HMG-COA REDUCTASE INHIBITOR: Status: ACTIVE | Noted: 2021-03-20

## 2024-04-11 PROBLEM — E11.9 DIABETES (HCC): Status: ACTIVE | Noted: 2024-04-11

## 2024-04-11 PROBLEM — M85.80 OSTEOPENIA: Status: ACTIVE | Noted: 2021-03-20

## 2024-04-11 PROBLEM — K57.30 DIVERTICULAR DISEASE OF COLON: Status: ACTIVE | Noted: 2021-03-20

## 2024-04-11 PROBLEM — M19.90 OSTEOARTHRITIS: Status: ACTIVE | Noted: 2021-03-20

## 2024-04-11 PROBLEM — K21.9 GASTROESOPHAGEAL REFLUX DISEASE WITHOUT ESOPHAGITIS: Status: ACTIVE | Noted: 2021-03-20

## 2024-04-11 PROBLEM — E11.319 DIABETIC RETINOPATHY (HCC): Status: ACTIVE | Noted: 2021-03-20

## 2024-04-11 NOTE — ASSESSMENT & PLAN NOTE
ADA diet, carb counting, low fat, high fiber intake. Water or low calorie drinks. Aerobic exercise. Weight loss Continue same medications.   Lab Results   Component Value Date    HGBA1C 6.2 12/14/2023

## 2024-04-11 NOTE — PROGRESS NOTES
Name: Pamela Bazan      : 1950      MRN: [id number]  Encounter Provider: Demetrius Charlton MD  Encounter Date: 2024   Encounter department: [facility]    Assessment & Plan     1. Coronary artery disease involving native coronary artery of native heart without angina pectoris  Assessment & Plan:  Sees Dr. Alvarez - cardiology. Continue same medications.       2. Hyperlipidemia, unspecified hyperlipidemia type  Assessment & Plan:  Continue rapatha.      3. Type 2 diabetes mellitus with stage 3a chronic kidney disease, without long-term current use of insulin ([facility])  Assessment & Plan:  ADA diet, carb counting, low fat, high fiber intake. Water or low calorie drinks. Aerobic exercise. Weight loss Continue same medications.   Lab Results   Component Value Date    HGBA1C 6.2 2023       4. Acute right-sided low back pain without sciatica  Comments:  Tylenol           Subjective      Denies chest pain, shortness of breath, headache, or visual symptoms. Reviewed and updated PMHx, PSHx, Family Hx, Allergies, and Meds.  Low back pain. Taking tylenol.     Abdominal Pain  Pertinent negatives include no constipation, diarrhea or dysuria.     Review of Systems   Constitutional:  Negative for fatigue.   Respiratory:  Negative for shortness of breath.    Cardiovascular:  Negative for chest pain.   Gastrointestinal:  Positive for abdominal pain. Negative for constipation and diarrhea.   Genitourinary:  Negative for dysuria.   Musculoskeletal:  Positive for back pain.   Neurological:  Negative for dizziness.       Current Outpatient Medications on File Prior to Visit   Medication Sig   • amLODIPine (NORVASC) 5 mg tablet Take 5 mg by mouth daily   • aspirin (ECOTRIN LOW STRENGTH) 81 mg EC tablet Take 81 mg by mouth daily   • clopidogrel (PLAVIX) 75 mg tablet Take 1 tablet (75 mg total) by mouth daily   • metFORMIN (GLUCOPHAGE) 500 mg tablet Take 500 mg by mouth in the morning   •  nitroglycerin (NITROSTAT) 0.4 mg SL tablet Place 1 tablet (0.4 mg total) under the tongue every 5 (five) minutes as needed for chest pain   • olopatadine HCl (PATADAY) 0.2 % opth drops 1 drop   • OneTouch Verio test strip TEST TWICE A DAY   • Repatha 140 MG/ML SOSY INJECT 1 ML UNDER TH SKIN EVERY 14 DAYS FOR 3 DOSES   • [DISCONTINUED] fluticasone (FLONASE) 50 mcg/act nasal spray 2 sprays into each nostril if needed   • [DISCONTINUED] omeprazole (PriLOSEC OTC) 20 MG tablet Take 20 mg by mouth if needed       Objective     /72 (BP Location: Right arm, Patient Position: Sitting, Cuff Size: Large)   Pulse 70   Temp 98.4 °F (36.9 °C)   Resp 16   Wt 97.5 kg (215 lb)   SpO2 98%   BMI 33.67 kg/m²     Physical Exam  HENT:      Head: Normocephalic.   Eyes:      Conjunctiva/sclera: Conjunctivae normal.   Cardiovascular:      Rate and Rhythm: Normal rate and regular rhythm.   Pulmonary:      Breath sounds: Normal breath sounds.   Abdominal:      General: Abdomen is flat. Bowel sounds are normal.      Palpations: Abdomen is soft.   Neurological:      General: No focal deficit present.      Mental Status: She is alert.      Comments: Low back tenderness.       Demetrius Charlton MD

## 2024-04-11 NOTE — TELEPHONE ENCOUNTER
Upon review of the In Basket request we were able to locate, review, and update the patient chart as requested for CRC: Colonoscopy, Diabetic Eye Exam, Hemoglobin A1c, Mammogram, and Medicare AWV.    Any additional questions or concerns should be emailed to the Practice Liaisons via the appropriate education email address, please do not reply via In Basket.    Thank you  Sara Contreras

## 2024-04-26 ENCOUNTER — APPOINTMENT (OUTPATIENT)
Age: 74
End: 2024-04-26
Payer: COMMERCIAL

## 2024-04-26 DIAGNOSIS — E11.9 TYPE 2 DIABETES MELLITUS WITHOUT COMPLICATION, UNSPECIFIED WHETHER LONG TERM INSULIN USE (HCC): ICD-10-CM

## 2024-04-26 DIAGNOSIS — E78.00 HYPERCHOLESTEREMIA: ICD-10-CM

## 2024-04-26 PROCEDURE — 80048 BASIC METABOLIC PNL TOTAL CA: CPT

## 2024-04-26 PROCEDURE — 82550 ASSAY OF CK (CPK): CPT

## 2024-04-26 PROCEDURE — 80076 HEPATIC FUNCTION PANEL: CPT

## 2024-04-26 PROCEDURE — 36415 COLL VENOUS BLD VENIPUNCTURE: CPT

## 2024-04-26 PROCEDURE — 83036 HEMOGLOBIN GLYCOSYLATED A1C: CPT

## 2024-04-26 PROCEDURE — 80061 LIPID PANEL: CPT

## 2024-04-27 LAB
ALBUMIN SERPL BCP-MCNC: 3.7 G/DL (ref 3.5–5)
ALP SERPL-CCNC: 50 U/L (ref 34–104)
ALT SERPL W P-5'-P-CCNC: 10 U/L (ref 7–52)
ANION GAP SERPL CALCULATED.3IONS-SCNC: 8 MMOL/L (ref 4–13)
AST SERPL W P-5'-P-CCNC: 17 U/L (ref 13–39)
BILIRUB DIRECT SERPL-MCNC: 0.11 MG/DL (ref 0–0.2)
BILIRUB SERPL-MCNC: 0.46 MG/DL (ref 0.2–1)
BUN SERPL-MCNC: 9 MG/DL (ref 5–25)
CALCIUM SERPL-MCNC: 8.9 MG/DL (ref 8.4–10.2)
CHLORIDE SERPL-SCNC: 104 MMOL/L (ref 96–108)
CHOLEST SERPL-MCNC: 190 MG/DL
CK SERPL-CCNC: 39 U/L (ref 26–192)
CO2 SERPL-SCNC: 29 MMOL/L (ref 21–32)
CREAT SERPL-MCNC: 1.03 MG/DL (ref 0.6–1.3)
EST. AVERAGE GLUCOSE BLD GHB EST-MCNC: 131 MG/DL
GFR SERPL CREATININE-BSD FRML MDRD: 53 ML/MIN/1.73SQ M
GLUCOSE P FAST SERPL-MCNC: 96 MG/DL (ref 65–99)
HBA1C MFR BLD: 6.2 %
HDLC SERPL-MCNC: 62 MG/DL
LDLC SERPL CALC-MCNC: 107 MG/DL (ref 0–100)
NONHDLC SERPL-MCNC: 128 MG/DL
POTASSIUM SERPL-SCNC: 4.1 MMOL/L (ref 3.5–5.3)
PROT SERPL-MCNC: 6.9 G/DL (ref 6.4–8.4)
SODIUM SERPL-SCNC: 141 MMOL/L (ref 135–147)
TRIGL SERPL-MCNC: 104 MG/DL

## 2024-04-27 PROCEDURE — 3044F HG A1C LEVEL LT 7.0%: CPT | Performed by: FAMILY MEDICINE

## 2024-05-01 ENCOUNTER — OFFICE VISIT (OUTPATIENT)
Dept: CARDIOLOGY CLINIC | Facility: CLINIC | Age: 74
End: 2024-05-01
Payer: COMMERCIAL

## 2024-05-01 VITALS
SYSTOLIC BLOOD PRESSURE: 112 MMHG | DIASTOLIC BLOOD PRESSURE: 80 MMHG | WEIGHT: 214 LBS | BODY MASS INDEX: 33.52 KG/M2 | HEART RATE: 79 BPM | OXYGEN SATURATION: 98 %

## 2024-05-01 DIAGNOSIS — I25.10 CORONARY ARTERY DISEASE INVOLVING NATIVE CORONARY ARTERY OF NATIVE HEART WITHOUT ANGINA PECTORIS: ICD-10-CM

## 2024-05-01 DIAGNOSIS — E66.9 OBESITY (BMI 30-39.9): ICD-10-CM

## 2024-05-01 DIAGNOSIS — I10 HYPERTENSION, UNSPECIFIED TYPE: ICD-10-CM

## 2024-05-01 DIAGNOSIS — E11.69 TYPE 2 DIABETES MELLITUS WITH OTHER SPECIFIED COMPLICATION, WITHOUT LONG-TERM CURRENT USE OF INSULIN (HCC): ICD-10-CM

## 2024-05-01 DIAGNOSIS — Z95.5 S/P DRUG ELUTING CORONARY STENT PLACEMENT: ICD-10-CM

## 2024-05-01 DIAGNOSIS — E78.5 HYPERLIPIDEMIA, UNSPECIFIED HYPERLIPIDEMIA TYPE: Primary | ICD-10-CM

## 2024-05-01 DIAGNOSIS — Z78.9 STATIN INTOLERANCE: ICD-10-CM

## 2024-05-01 PROCEDURE — 99214 OFFICE O/P EST MOD 30 MIN: CPT | Performed by: INTERNAL MEDICINE

## 2024-05-01 NOTE — PROGRESS NOTES
Cardiology Follow Up Visit       Pamela Bazan   [id number]  1950      [facility]ART [facility]ASC[facility]  [address] [place] [id number]      Primary Care Physician:   Demetrius Charlton MD    Reason for Follow Up Visit / Principal Problem:  Mrs. Pamela Bazan is a 73 y.o. female and never smoker with a PMH significant for but not limited to CAD (nonobstructive, 2019), HLD, HTN, DMII, GERD, and Obesity.  She presents to clinic for Hospital Follow Up.    Interval History:  Mrs. Bazan was originally seen in clinic on 02/08/24 for Interventional Cardiology Consultation. She'd been at her baseline state of health: independent of adl's, retired as a CNA , though not exercising regularly, when she  had a fall in Nov 2023 .  She stated that she was at home and walking downstairs when she slipped, hit her chest against the railing, and slid down the stairs.  She managed the event conservatively but eventually saw our partner, Dr. Jiang for ongoing CP on 11/16/23.  She described resting and exertional pain, so she underwent a NM MPI that was positive for a fixed anterior infarct with roxanna-infarct ischemia.  She previously failed several statin trials, and was started on praluent but noted constipation on the medication and discontinued it.  She also noted increased depression and lightheadedness on metoprolol, so that too was discontinued. She was referred to [facility] but had reservations. She denied any recent hospitalizations, prior cardiac history, family history of early cad, or family history of scd. She stated her last LHC was at [facility] in [place], [place] and was negative obstructive CAD several year ago.     We proceed with the [facility] that was positive for moderate pLAD disease and severe mLCX disease requiring PCI with MARIPOSA x 1 to the LCX. She tolerated the procedure well and is here today for follow up.  She notes stable back pain that is managed with TYL.  She  also notes not fully understanding how to take NTG.  She notes HAD and fatigue when she has tried it in the past for symptoms other than angina.  She has yet to start cardiac rehab but has plans to get a [employer] soon. She currently denies any anginal cp, diaphoresis, n/v, sob, palpitations, near-syncope, syncope, orthopnea, pnd, or ble edema.  She notes improved control of her diet and exercise habits. She reports a diet that is somewhat balanced but has room for improvement, salt intake that is well controlled, sufficient daily water intake, no caffeine intake, no alcohol intake, and exercise that is inconsistent but with plans for plans for improvement. She is otherwise compliant with medications but does not maintain a detailed BP log.  Of note, the patient's cardiac risk factor(s) include: advanced age, hypertension, dyslipidemia, diabetes mellitus, obesity, and sedentary lifestyle.    Assessment & Plan   CAD, s/p PCI with MARIPOSA x 1 to the mLCX  Patient continues to have recurrent symptoms, Pharmacologic NM MPI with a fixed ant defect with roxanna-infarct ischemia  Ischemic testing with [facility], Proceed with already ordered TTE, Cont GDMT for CAD on asa / trial of low dose cresto qod / trial of repatha / hold on bb for now, Cont aggressive risk factor modification, Maintain BP log, Cont counseling on diet/lifestyle modification for weight management as well  Monitor closely for symptomatic changes, Record BP/HR and log if symptoms return, ED/Clinic precautions reviewed    HTN, long-standing  No home BP log for review, but clinic SBP in the 110-140 mmHg range  Cont current medication regimen: amlodipine 5, cont counseling on low-sodium diet, Review the importance of maintaining a home BP log  Monitor BP at home routinely and review log on next visit    HLD, recent FLP:  /  / HDL 62 /  on 04/26/24  Improved , Tolerating med mgmt without adverse reactions on repatha  Cont current medication regimen,  cont counseling on diet and lifestyle modification  Monitor FLP as noted above, will reassess on next visit    DM Type II, most recent A1c 6.2 on 04/26/24  Tolerating medical mgmt without progressive symptoms  Check A1c per PCP, Cont counseling on diet and lifestyle changes  Monitor labs as noted above, will reassess on next visit    Obesity, Body mass index is 33.52 kg/m².  Weight has improved significant with a 70 lb weight loss from 285 to 210 two years ago , now stable over the last year at 215 lb  Cont to review the importance of diet and lifestyle modification  Will monitor routinely at this time    Discussion / Summary:  Precautions and reasons to call our office or proceed to ER were discussed in detail. Patient expressed understanding and questions were answered. Plan on follow up in-clinic in 4 months.    Office Visit Diagnosis:  1. Hyperlipidemia, unspecified hyperlipidemia type  Lipid Panel with Direct LDL reflex    Evolocumab (Repatha) 140 MG/ML SOSY      2. Coronary artery disease involving native coronary artery of native heart without angina pectoris        3. Hypertension, unspecified type        4. Statin intolerance        5. S/P drug eluting coronary stent placement        6. Type 2 diabetes mellitus with other specified complication, without long-term current use of insulin (HCC)        7. Obesity (BMI 30-39.9)              Subjective   Review of Systems   Constitutional: Negative for chills, diaphoresis, fever and malaise/fatigue.   HENT:  Negative for congestion and sore throat.    Eyes:  Negative for blurred vision and double vision.   Cardiovascular:  Positive for chest pain (twingers, no more of the exertional cp) and near-syncope (when she was taking metoprolol). Negative for claudication, dyspnea on exertion, leg swelling, orthopnea, palpitations, paroxysmal nocturnal dyspnea and syncope.   Respiratory:  Negative for cough, shortness of breath, sleep disturbances due to breathing, snoring  and wheezing.    Hematologic/Lymphatic: Negative for bleeding problem. Bruises/bleeds easily (while on praluent).   Skin:  Negative for itching and rash.   Musculoskeletal:  Positive for back pain. Negative for joint pain and joint swelling.   Gastrointestinal:  Positive for constipation (occasionally). Negative for abdominal pain, diarrhea, nausea and vomiting.   Genitourinary:  Negative for dysuria and hematuria.   Neurological:  Negative for numbness and paresthesias.   Psychiatric/Behavioral:  Positive for depression (while on the metoprolol). The patient is not nervous/anxious.      The following portions of the patient's history were reviewed and updated as appropriate: past medical history, past social history, past family history, past surgical history, current medications, allergies, past surgical history and problem list.  Past Medical History:   Diagnosis Date   • CAD (coronary artery disease) 03/07/2024     Social History     Socioeconomic History   • Marital status: /Civil Union     Spouse name: Not on file   • Number of children: Not on file   • Years of education: Not on file   • Highest education level: Not on file   Occupational History   • Not on file   Tobacco Use   • Smoking status: Never     Passive exposure: Never   • Smokeless tobacco: Never   Vaping Use   • Vaping status: Never Used   Substance and Sexual Activity   • Alcohol use: Not Currently   • Drug use: Never   • Sexual activity: Not Currently   Other Topics Concern   • Not on file   Social History Narrative   • Not on file     Social Determinants of Health     Financial Resource Strain: Not on file   Food Insecurity: No Food Insecurity (5/2/2024)    Hunger Vital Sign    • Worried About Running Out of Food in the Last Year: Never true    • Ran Out of Food in the Last Year: Never true   Transportation Needs: No Transportation Needs (5/2/2024)    PRAPARE - Transportation    • Lack of Transportation (Medical): No    • Lack of  Transportation (Non-Medical): No   Physical Activity: Not on file   Stress: Not on file   Social Connections: Not on file   Intimate Partner Violence: Not on file   Housing Stability: Unknown (5/2/2024)    Housing Stability Vital Sign    • Unable to Pay for Housing in the Last Year: No    • Number of Times Moved in the Last Year: Not on file    • Homeless in the Last Year: Not on file     No family history on file.  Past Surgical History:   Procedure Laterality Date   • CARDIAC CATHETERIZATION N/A 3/7/2024    Procedure: Cardiac catheterization;  Surgeon: Valerie Alvarez DO;  Location: [facility];  Service: Cardiology   • CARDIAC CATHETERIZATION N/A 3/7/2024    Procedure: Cardiac pci;  Surgeon: Valerie Alvarez DO;  Location: [facility];  Service: Cardiology   • CARDIAC CATHETERIZATION N/A 3/7/2024    Procedure: Cardiac Coronary Angiogram;  Surgeon: Valerie Alvarez DO;  Location: [facility];  Service: Cardiology       Current Outpatient Medications:   •  aspirin (ECOTRIN LOW STRENGTH) 81 mg EC tablet, Take 81 mg by mouth daily, Disp: , Rfl:   •  Evolocumab (Repatha) 140 MG/ML SOSY, Inject 1 mL (140 mg total) under the skin every 14 (fourteen) days for 24 doses, Disp: 6 mL, Rfl: 3  •  olopatadine HCl (PATADAY) 0.2 % opth drops, 1 drop, Disp: , Rfl:   •  amLODIPine (NORVASC) 5 mg tablet, Take 1 tablet (5 mg total) by mouth daily, Disp: 90 tablet, Rfl: 1  •  clopidogrel (PLAVIX) 75 mg tablet, Take 1 tablet (75 mg total) by mouth daily, Disp: 90 tablet, Rfl: 1  •  metFORMIN (GLUCOPHAGE) 500 mg tablet, Take 1 tablet (500 mg total) by mouth in the morning, Disp: 90 tablet, Rfl: 1  •  methylPREDNISolone 4 MG tablet therapy pack, Use as directed on package, Disp: 21 each, Rfl: 0  •  nitroglycerin (NITROSTAT) 0.4 mg SL tablet, Place 1 tablet (0.4 mg total) under the tongue every 5 (five) minutes as needed for chest pain, Disp: 30 tablet, Rfl: 1  •  OneTouch Verio test strip, , Disp: , Rfl:    Allergies   Allergen Reactions   • Penicillins Hives and Swelling     Tongue swelling   • Simvastatin Myalgia   • Metoprolol Dizziness and Confusion     Sweating, cold hands and feet.    • Praluent [Alirocumab] Dizziness     Bruising     • Lisinopril Cough     Patient Active Problem List   Diagnosis   • CAD (coronary artery disease)   • S/P drug eluting coronary stent placement   • Diabetes (HCC)   • Diabetic retinopathy (HCC)   • Hyperlipidemia, unspecified   • Low back pain   • Diverticular disease of colon   • Adverse effect of HMG-CoA reductase inhibitor   • Gastroesophageal reflux disease without esophagitis   • Osteopenia   • Osteoarthritis   • Acute right-sided low back pain without sciatica       Objective     Vitals:    05/01/24 1642   BP: 112/80   BP Location: Left arm   Patient Position: Sitting   Cuff Size: Large   Pulse: 79   SpO2: 98%   Weight: 97.1 kg (214 lb)       Body mass index is 33.52 kg/m².  ?  Physical Exam  Constitutional:       General: She is not in acute distress.     Appearance: She is obese. She is not toxic-appearing.   HENT:      Head: Normocephalic and atraumatic.      Right Ear: External ear normal.      Left Ear: External ear normal.      Nose: Nose normal.   Eyes:      General: No scleral icterus.        Right eye: No discharge.         Left eye: No discharge.   Neck:      Vascular: No carotid bruit.   Cardiovascular:      Rate and Rhythm: Normal rate and regular rhythm.      Pulses: Normal pulses.      Heart sounds: No murmur heard.     No gallop.   Pulmonary:      Effort: Pulmonary effort is normal. No respiratory distress.      Breath sounds: No wheezing or rales.   Musculoskeletal:      Cervical back: Neck supple.      Right lower leg: No edema.      Left lower leg: No edema.   Skin:     General: Skin is warm and dry.      Capillary Refill: Capillary refill takes less than 2 seconds.   Neurological:      General: No focal deficit present.      Mental Status: She is alert and  "oriented to person, place, and time.   Psychiatric:         Mood and Affect: Mood normal.         Behavior: Behavior normal.         Labs:  Lab Results   Component Value Date    K 4.1 04/26/2024    K 4.2 03/08/2024    K 4.3 03/07/2024    K 4.7 05/06/2022     04/26/2024     03/08/2024     03/07/2024     05/06/2022    CO2 29 04/26/2024    CO2 26 03/08/2024    CO2 27 03/07/2024    CO2 28 05/06/2022    BUN 9 04/26/2024    BUN 9 03/08/2024    BUN 11 03/07/2024    BUN 16 05/06/2022    CREATININE 1.03 04/26/2024    CREATININE 0.85 03/08/2024    CREATININE 0.98 03/07/2024    CREATININE 1.30 (H) 05/06/2022    EGFR 53 04/26/2024    EGFR 67 03/08/2024    EGFR 56 03/07/2024    GLUC 93 03/08/2024    GLUC 114 03/07/2024    GLUC 120 (H) 05/06/2022    AST 17 04/26/2024    AST 18 05/06/2022    ALT 10 04/26/2024    ALT 10 05/06/2022    TBILI 0.46 04/26/2024    TBILI 0.5 05/06/2022    ALB 3.7 04/26/2024    ALB 3.8 05/06/2022    CALCIUM 8.9 04/26/2024    CALCIUM 8.9 03/08/2024    CALCIUM 9.0 03/07/2024    CALCIUM 9.4 05/06/2022      Lab Results   Component Value Date    WBC 6.79 03/08/2024    HGB 11.1 (L) 03/08/2024    HCT 33.5 (L) 03/08/2024     03/08/2024      Lab Results   Component Value Date    CHOLESTEROL 190 04/26/2024    TRIG 104 04/26/2024    HDL 62 04/26/2024    LDLCALC 107 (H) 04/26/2024     Lab Results   Component Value Date    HGBA1C 6.2 (H) 04/26/2024    HGBA1C 6.2 12/14/2023    HGBA1C 6.7 01/18/2023    HGBA1C 6.5 06/14/2021    GLUF 96 04/26/2024    GLUF 93 03/08/2024    GLUF 114 (H) 03/07/2024    POCGLU 108 03/08/2024    POCGLU 94 03/07/2024    POCGLU 108 03/07/2024     No results found for: \"TSH\", \"FT4\"  No results found for: \"HSTNI0\", \"HSTNI2\", \"HSTNI4\", \"TROPONINI\"  No results found for: \"BNP\", \"NTBNP\"     Imaging:  I have personally reviewed pertinent reports.  No results found.    Cardiac Studies:  EKG:   Date: 03/07/24, normal sinus rhythm, T wave findings consistent with lat " ischemia  Date: 03/07/24, normal sinus rhythm, nonspecific T wave findings  Date: 11/16/23, sinus rhythm with sinus arrhythmia, lvh with repolarization abnormality    Tele:   No results found for this or any previous visit.     Holter:   No results found for this or any previous visit.    Recent Device Check:  No results found for this or any previous visit.    Previous EPS/Interventions:  No results found for this or any previous visit.    Previous Cath/PCI:  Results for testing completed on the encounter of 03/07/24  Study: LHC / PCI  Interpreted Summary  •  S/P PCI with MARIPOSA x 1 to the Mid LCX   •  Left Anterior Descending  Prox LAD lesion is 35% stenosed. Not the culprit lesion. HINA flow is 3.  •  Left Circumflex  Mid Cx lesion is 85% stenosed. Culprit lesion. Culprit for anginal symptoms.HINA flow is 3.  •  Right Coronary Artery  Prox RCA lesion is 20% stenosed. Not the culprit lesion. HINA flow is 3.    Previous STRESS TEST:  Results for orders placed during the hospital encounter of 11/30/23  NM myocardial perfusion spect (rx stress and/or rest)  Interpretation Summary  •  Stress ECG: No ST deviation is noted. There were no arrhythmias during stress. There were no arrhythmias during recovery. . The ECG was not diagnostic due to resting ST-T abnormalities.  •  Stress Function: Left ventricular function post-stress is normal. Stress ejection fraction is 62 %.  •  Perfusion: There is a left ventricular perfusion defect that is large in size with moderate reduction in uptake present in the entire anterior location(s) that is partially reversible. There is likely a small amount of artifact caused by breast attenuation.  •  Stress Combined Conclusion: Left ventricular perfusion is abnormal. There is mild roxanna-infarct ischemia of the entire anterior wall.  Abnormal pharmacologic perfusion scan. There is an infarct of the entire anterior wall with mild roxanna-infarct ischemia.     ECHO:  Results for testing completed  on the encounter of 03/07/24  Study: Echo complete w/ contrast if indicated  Interpreted Summary  •  Left Ventricle: Left ventricular cavity size is normal. Wall thickness is mildly increased. The left ventricular ejection fraction is 50%. Systolic function is low normal. Diastolic function is mildly abnormal, consistent with grade I (abnormal) relaxation.  Left atrial filling pressure is normal.  •  The following segments are hypokinetic: basal inferior, basal inferolateral, mid inferior, mid inferolateral, mid anterolateral and apical lateral.  •  All other segments are normal.  •  Aortic Valve: There is aortic valve sclerosis.  •  Mitral Valve: There is mild regurgitation.    QUINCY:  No results found for this or any previous visit.    CMR:  No results found for this or any previous visit.    Counseling / Coordination of Care:  During our visit, I spent 20 minutes with the patient, and greater than 50% of this time was spent on counseling and coordination of care, including addressing diagnostic results, prognosis, risks and benefits of treatment options, instructions for management, patient/family education, importance of treatment compliance, along with risk factor reductions.    Dictation Disclaimer:  This note was completed in part utilizing Client Outlook direct voice recognition software. Grammatical errors, random word insertion, spelling mistakes, and incomplete sentences may be an occasional consequence of the system secondary to software limitations, ambient noise and hardware issues. At the time of dictation, efforts were made to edit, clarify and /or correct errors.  Please read the chart carefully and recognize, using context, where substitutions have occurred.  If you have any questions or concerns about the context, text or information contained within the body of this dictation, please contact myself, the provider, for further clarification.     Valerie Alvarez, DO 06/05/24

## 2024-05-02 ENCOUNTER — OFFICE VISIT (OUTPATIENT)
Age: 74
End: 2024-05-02
Payer: COMMERCIAL

## 2024-05-02 VITALS
OXYGEN SATURATION: 98 % | DIASTOLIC BLOOD PRESSURE: 70 MMHG | HEIGHT: 67 IN | BODY MASS INDEX: 33.84 KG/M2 | HEART RATE: 72 BPM | SYSTOLIC BLOOD PRESSURE: 120 MMHG | WEIGHT: 215.6 LBS

## 2024-05-02 DIAGNOSIS — Z00.00 MEDICARE ANNUAL WELLNESS VISIT, SUBSEQUENT: Primary | ICD-10-CM

## 2024-05-02 DIAGNOSIS — E11.22 TYPE 2 DIABETES MELLITUS WITH STAGE 3A CHRONIC KIDNEY DISEASE, WITHOUT LONG-TERM CURRENT USE OF INSULIN (HCC): ICD-10-CM

## 2024-05-02 DIAGNOSIS — M54.50 ACUTE RIGHT-SIDED LOW BACK PAIN WITHOUT SCIATICA: ICD-10-CM

## 2024-05-02 DIAGNOSIS — Z95.5 S/P DRUG ELUTING CORONARY STENT PLACEMENT: ICD-10-CM

## 2024-05-02 DIAGNOSIS — E78.5 HYPERLIPIDEMIA, UNSPECIFIED HYPERLIPIDEMIA TYPE: ICD-10-CM

## 2024-05-02 DIAGNOSIS — N18.31 TYPE 2 DIABETES MELLITUS WITH STAGE 3A CHRONIC KIDNEY DISEASE, WITHOUT LONG-TERM CURRENT USE OF INSULIN (HCC): ICD-10-CM

## 2024-05-02 DIAGNOSIS — I25.10 CORONARY ARTERY DISEASE INVOLVING NATIVE CORONARY ARTERY OF NATIVE HEART WITHOUT ANGINA PECTORIS: ICD-10-CM

## 2024-05-02 PROCEDURE — G0439 PPPS, SUBSEQ VISIT: HCPCS | Performed by: FAMILY MEDICINE

## 2024-05-02 RX ORDER — CLOPIDOGREL BISULFATE 75 MG/1
75 TABLET ORAL DAILY
Qty: 90 TABLET | Refills: 1 | Status: SHIPPED | OUTPATIENT
Start: 2024-05-02

## 2024-05-02 RX ORDER — EVOLOCUMAB 140 MG/ML
1 INJECTION, SOLUTION SUBCUTANEOUS
Qty: 6 ML | Refills: 0 | Status: SHIPPED | OUTPATIENT
Start: 2024-05-02 | End: 2024-05-05 | Stop reason: SDUPTHER

## 2024-05-02 RX ORDER — AMLODIPINE BESYLATE 5 MG/1
5 TABLET ORAL DAILY
Qty: 90 TABLET | Refills: 1 | Status: SHIPPED | OUTPATIENT
Start: 2024-05-02

## 2024-05-02 RX ORDER — METHYLPREDNISOLONE 4 MG/1
TABLET ORAL
Qty: 21 EACH | Refills: 0 | Status: SHIPPED | OUTPATIENT
Start: 2024-05-02

## 2024-05-02 NOTE — ASSESSMENT & PLAN NOTE
ADA diet, carb counting, low fat, high fiber intake. Water or low calorie drinks. Aerobic exercise. Weight loss. Continue metformin.     Lab Results   Component Value Date    HGBA1C 6.2 (H) 04/26/2024

## 2024-05-02 NOTE — PATIENT INSTRUCTIONS
Medicare Preventive Visit Patient Instructions  Thank you for completing your Welcome to Medicare Visit or Medicare Annual Wellness Visit today. Your next wellness visit will be due in one year (5/3/2025).  The screening/preventive services that you may require over the next 5-10 years are detailed below. Some tests may not apply to you based off risk factors and/or age. Screening tests ordered at today's visit but not completed yet may show as past due. Also, please note that scanned in results may not display below.  Preventive Screenings:  Service Recommendations Previous Testing/Comments   Colorectal Cancer Screening  * Colonoscopy    * Fecal Occult Blood Test (FOBT)/Fecal Immunochemical Test (FIT)  * Fecal DNA/Cologuard Test  * Flexible Sigmoidoscopy Age: 45-75 years old   Colonoscopy: every 10 years (may be performed more frequently if at higher risk)  OR  FOBT/FIT: every 1 year  OR  Cologuard: every 3 years  OR  Sigmoidoscopy: every 5 years  Screening may be recommended earlier than age 45 if at higher risk for colorectal cancer. Also, an individualized decision between you and your healthcare provider will decide whether screening between the ages of 76-85 would be appropriate. Colonoscopy: 10/26/2023  FOBT/FIT: Not on file  Cologuard: Not on file  Sigmoidoscopy: Not on file          Breast Cancer Screening Age: 40+ years old  Frequency: every 1-2 years  Not required if history of left and right mastectomy Mammogram: 01/19/2023        Cervical Cancer Screening Between the ages of 21-29, pap smear recommended once every 3 years.   Between the ages of 30-65, can perform pap smear with HPV co-testing every 5 years.   Recommendations may differ for women with a history of total hysterectomy, cervical cancer, or abnormal pap smears in past. Pap Smear: Not on file        Hepatitis C Screening Once for adults born between 1945 and 1965  More frequently in patients at high risk for Hepatitis C Hep C Antibody: Not on  file        Diabetes Screening 1-2 times per year if you're at risk for diabetes or have pre-diabetes Fasting glucose: 96 mg/dL (4/26/2024)  A1C: 6.2 % (4/26/2024)      Cholesterol Screening Once every 5 years if you don't have a lipid disorder. May order more often based on risk factors. Lipid panel: 04/26/2024          Other Preventive Screenings Covered by Medicare:  Abdominal Aortic Aneurysm (AAA) Screening: covered once if your at risk. You're considered to be at risk if you have a family history of AAA.  Lung Cancer Screening: covers low dose CT scan once per year if you meet all of the following conditions: (1) Age 55-77; (2) No signs or symptoms of lung cancer; (3) Current smoker or have quit smoking within the last 15 years; (4) You have a tobacco smoking history of at least 20 pack years (packs per day multiplied by number of years you smoked); (5) You get a written order from a healthcare provider.  Glaucoma Screening: covered annually if you're considered high risk: (1) You have diabetes OR (2) Family history of glaucoma OR (3)  aged 50 and older OR (4)  American aged 65 and older  Osteoporosis Screening: covered every 2 years if you meet one of the following conditions: (1) You're estrogen deficient and at risk for osteoporosis based off medical history and other findings; (2) Have a vertebral abnormality; (3) On glucocorticoid therapy for more than 3 months; (4) Have primary hyperparathyroidism; (5) On osteoporosis medications and need to assess response to drug therapy.   Last bone density test (DXA Scan): Not on file.  HIV Screening: covered annually if you're between the age of 15-65. Also covered annually if you are younger than 15 and older than 65 with risk factors for HIV infection. For pregnant patients, it is covered up to 3 times per pregnancy.    Immunizations:  Immunization Recommendations   Influenza Vaccine Annual influenza vaccination during flu season is  recommended for all persons aged >= 6 months who do not have contraindications   Pneumococcal Vaccine   * Pneumococcal conjugate vaccine = PCV13 (Prevnar 13), PCV15 (Vaxneuvance), PCV20 (Prevnar 20)  * Pneumococcal polysaccharide vaccine = PPSV23 (Pneumovax) Adults 19-63 yo with certain risk factors or if 65+ yo  If never received any pneumonia vaccine: recommend Prevnar 20 (PCV20)  Give PCV20 if previously received 1 dose of PCV13 or PPSV23   Hepatitis B Vaccine 3 dose series if at intermediate or high risk (ex: diabetes, end stage renal disease, liver disease)   Respiratory syncytial virus (RSV) Vaccine - COVERED BY MEDICARE PART D  * RSVPreF3 (Arexvy) CDC recommends that adults 60 years of age and older may receive a single dose of RSV vaccine using shared clinical decision-making (SCDM)   Tetanus (Td) Vaccine - COST NOT COVERED BY MEDICARE PART B Following completion of primary series, a booster dose should be given every 10 years to maintain immunity against tetanus. Td may also be given as tetanus wound prophylaxis.   Tdap Vaccine - COST NOT COVERED BY MEDICARE PART B Recommended at least once for all adults. For pregnant patients, recommended with each pregnancy.   Shingles Vaccine (Shingrix) - COST NOT COVERED BY MEDICARE PART B  2 shot series recommended in those 19 years and older who have or will have weakened immune systems or those 50 years and older     Health Maintenance Due:      Topic Date Due   • Hepatitis C Screening  Never done   • Breast Cancer Screening: Mammogram  01/19/2024   • Colorectal Cancer Screening  10/26/2028     Immunizations Due:      Topic Date Due   • IPV Vaccine (2 of 3 - Adult catch-up series) 04/17/2012   • COVID-19 Vaccine (7 - 2023-24 season) 11/30/2023     Advance Directives   What are advance directives?  Advance directives are legal documents that state your wishes and plans for medical care. These plans are made ahead of time in case you lose your ability to make  decisions for yourself. Advance directives can apply to any medical decision, such as the treatments you want, and if you want to donate organs.   What are the types of advance directives?  There are many types of advance directives, and each state has rules about how to use them. You may choose a combination of any of the following:  Living will:  This is a written record of the treatment you want. You can also choose which treatments you do not want, which to limit, and which to stop at a certain time. This includes surgery, medicine, IV fluid, and tube feedings.   Durable power of  for healthcare (DPAHC):  This is a written record that states who you want to make healthcare choices for you when you are unable to make them for yourself. This person, called a proxy, is usually a family member or a friend. You may choose more than 1 proxy.  Do not resuscitate (DNR) order:  A DNR order is used in case your heart stops beating or you stop breathing. It is a request not to have certain forms of treatment, such as CPR. A DNR order may be included in other types of advance directives.  Medical directive:  This covers the care that you want if you are in a coma, near death, or unable to make decisions for yourself. You can list the treatments you want for each condition. Treatment may include pain medicine, surgery, blood transfusions, dialysis, IV or tube feedings, and a ventilator (breathing machine).  Values history:  This document has questions about your views, beliefs, and how you feel and think about life. This information can help others choose the care that you would choose.  Why are advance directives important?  An advance directive helps you control your care. Although spoken wishes may be used, it is better to have your wishes written down. Spoken wishes can be misunderstood, or not followed. Treatments may be given even if you do not want them. An advance directive may make it easier for your family  to make difficult choices about your care.   Weight Management   Why it is important to manage your weight:  Being overweight increases your risk of health conditions such as heart disease, high blood pressure, type 2 diabetes, and certain types of cancer. It can also increase your risk for osteoarthritis, sleep apnea, and other respiratory problems. Aim for a slow, steady weight loss. Even a small amount of weight loss can lower your risk of health problems.  How to lose weight safely:  A safe and healthy way to lose weight is to eat fewer calories and get regular exercise. You can lose up about 1 pound a week by decreasing the number of calories you eat by 500 calories each day.   Healthy meal plan for weight management:  A healthy meal plan includes a variety of foods, contains fewer calories, and helps you stay healthy. A healthy meal plan includes the following:  Eat whole-grain foods more often.  A healthy meal plan should contain fiber. Fiber is the part of grains, fruits, and vegetables that is not broken down by your body. Whole-grain foods are healthy and provide extra fiber in your diet. Some examples of whole-grain foods are whole-wheat breads and pastas, oatmeal, brown rice, and bulgur.  Eat a variety of vegetables every day.  Include dark, leafy greens such as spinach, kale, yadi greens, and mustard greens. Eat yellow and orange vegetables such as carrots, sweet potatoes, and winter squash.   Eat a variety of fruits every day.  Choose fresh or canned fruit (canned in its own juice or light syrup) instead of juice. Fruit juice has very little or no fiber.  Eat low-fat dairy foods.  Drink fat-free (skim) milk or 1% milk. Eat fat-free yogurt and low-fat cottage cheese. Try low-fat cheeses such as mozzarella and other reduced-fat cheeses.  Choose meat and other protein foods that are low in fat.  Choose beans or other legumes such as split peas or lentils. Choose fish, skinless poultry (chicken or  turkey), or lean cuts of red meat (beef or pork). Before you cook meat or poultry, cut off any visible fat.   Use less fat and oil.  Try baking foods instead of frying them. Add less fat, such as margarine, sour cream, regular salad dressing and mayonnaise to foods. Eat fewer high-fat foods. Some examples of high-fat foods include french fries, doughnuts, ice cream, and cakes.  Eat fewer sweets.  Limit foods and drinks that are high in sugar. This includes candy, cookies, regular soda, and sweetened drinks.  Exercise:  Exercise at least 30 minutes per day on most days of the week. Some examples of exercise include walking, biking, dancing, and swimming. You can also fit in more physical activity by taking the stairs instead of the elevator or parking farther away from stores. Ask your healthcare provider about the best exercise plan for you.      © Copyright [employer] 2018 Information is for End User's use only and may not be sold, redistributed or otherwise used for commercial purposes. All illustrations and images included in CareNotes® are the copyrighted property of [employer] or [employer]

## 2024-05-02 NOTE — PROGRESS NOTES
Assessment and Plan:     Problem List Items Addressed This Visit     CAD (coronary artery disease)     Sees cardio. Continue same medications.          Relevant Medications    amLODIPine (NORVASC) 5 mg tablet    clopidogrel (PLAVIX) 75 mg tablet    S/P drug eluting coronary stent placement    Relevant Medications    clopidogrel (PLAVIX) 75 mg tablet    Diabetes (HCC)     ADA diet, carb counting, low fat, high fiber intake. Water or low calorie drinks. Aerobic exercise. Weight loss. Continue metformin.     Lab Results   Component Value Date    HGBA1C 6.2 (H) 04/26/2024          Relevant Medications    metFORMIN (GLUCOPHAGE) 500 mg tablet    Other Relevant Orders    Basic metabolic panel    Hemoglobin A1C    Hyperlipidemia, unspecified     Low cholesterol diet. Encourage vegetables, fruit, and whole grains. Exercise.           Relevant Medications    Evolocumab (Repatha) 140 MG/ML SOSY    Other Relevant Orders    Lipid panel    Low back pain     Medrol dose pack.          Relevant Medications    methylPREDNISolone 4 MG tablet therapy pack   Other Visit Diagnoses     Medicare annual wellness visit, subsequent    -  Primary           Preventive health issues were discussed with patient, and age appropriate screening tests were ordered as noted in patient's After Visit Summary.  Personalized health advice and appropriate referrals for health education or preventive services given if needed, as noted in patient's After Visit Summary.     History of Present Illness:     Patient presents for a Medicare Wellness Visit    Here for medical wellness check.   Pamela Bazan is here for chronic conditions f/u. Pt. had labs done prior to today's visit which included Recent Results (from the past 672 hour(s))  -Basic metabolic panel:   Collection Time: 04/26/24 10:02 AM       Result                      Value             Ref Range           Sodium                      141               135 - 147 mm*       Potassium                    4.1               3.5 - 5.3 mm*       Chloride                    104               96 - 108 mmo*       CO2                         29                21 - 32 mmol*       ANION GAP                   8                 4 - 13 mmol/L       BUN                         9                 5 - 25 mg/dL        Creatinine                  1.03              0.60 - 1.30 *       Glucose, Fasting            96                65 - 99 mg/dL       Calcium                     8.9               8.4 - 10.2 m*       eGFR                        53                ml/min/1.73s*  -Lipid panel:   Collection Time: 04/26/24 10:02 AM       Result                      Value             Ref Range           Cholesterol                 190               See Comment *       Triglycerides               104               See Comment *       HDL, Direct                 62                >=50 mg/dL          LDL Calculated              107 (H)           0 - 100 mg/dL       Non-HDL-Chol (CHOL-HDL)     128               mg/dl          -Hemoglobin A1C:   Collection Time: 04/26/24 10:02 AM       Result                      Value             Ref Range           Hemoglobin A1C              6.2 (H)           Normal 4.0-5*       EAG                         131               mg/dl          -Hepatic function panel:   Collection Time: 04/26/24 10:02 AM       Result                      Value             Ref Range           Total Bilirubin             0.46              0.20 - 1.00 *       Bilirubin, Direct           0.11              0.00 - 0.20 *       Alkaline Phosphatase        50                34 - 104 U/L        AST                         17                13 - 39 U/L         ALT                         10                7 - 52 U/L          Total Protein               6.9               6.4 - 8.4 g/*       Albumin                     3.7               3.5 - 5.0 g/*  -CK:   Collection Time: 04/26/24 10:02 AM       Result                      Value              Ref Range           Total CK                    39                26 - 192 U/L         Patient Care Team:  Demetrius Charlton MD as PCP - General (Family Medicine)     Review of Systems:     Review of Systems   Constitutional:  Negative for fatigue.   Respiratory:  Negative for shortness of breath.    Cardiovascular:  Negative for chest pain.   Gastrointestinal:  Negative for abdominal pain, constipation and diarrhea.   Genitourinary:  Negative for dysuria.   Musculoskeletal:  Positive for back pain.   Neurological:  Negative for dizziness.        Problem List:     Patient Active Problem List   Diagnosis   • CAD (coronary artery disease)   • S/P drug eluting coronary stent placement   • Diabetes (HCC)   • Diabetic retinopathy (HCC)   • Hyperlipidemia, unspecified   • Low back pain   • Diverticular disease of colon   • Adverse effect of HMG-CoA reductase inhibitor   • Gastroesophageal reflux disease without esophagitis   • Osteopenia   • Osteoarthritis      Past Medical and Surgical History:     Past Medical History:   Diagnosis Date   • CAD (coronary artery disease) 03/07/2024     Past Surgical History:   Procedure Laterality Date   • CARDIAC CATHETERIZATION N/A 3/7/2024    Procedure: Cardiac catheterization;  Surgeon: Valerie Alvarez DO;  Location: [facility];  Service: Cardiology   • CARDIAC CATHETERIZATION N/A 3/7/2024    Procedure: Cardiac pci;  Surgeon: Valerie Alvarez DO;  Location: [facility];  Service: Cardiology   • CARDIAC CATHETERIZATION N/A 3/7/2024    Procedure: Cardiac Coronary Angiogram;  Surgeon: Valerie Alvarez DO;  Location: [facility];  Service: Cardiology      Family History:     History reviewed. No pertinent family history.   Social History:     Social History     Socioeconomic History   • Marital status: /Civil Union     Spouse name: None   • Number of children: None   • Years of education: None   • Highest education level: None   Occupational History    • None   Tobacco Use   • Smoking status: Never     Passive exposure: Never   • Smokeless tobacco: Never   Vaping Use   • Vaping status: Never Used   Substance and Sexual Activity   • Alcohol use: Not Currently   • Drug use: Never   • Sexual activity: Not Currently   Other Topics Concern   • None   Social History Narrative   • None     Social Determinants of Health     Financial Resource Strain: Not on file   Food Insecurity: No Food Insecurity (5/2/2024)    Hunger Vital Sign    • Worried About Running Out of Food in the Last Year: Never true    • Ran Out of Food in the Last Year: Never true   Transportation Needs: No Transportation Needs (5/2/2024)    PRAPARE - Transportation    • Lack of Transportation (Medical): No    • Lack of Transportation (Non-Medical): No   Physical Activity: Not on file   Stress: Not on file   Social Connections: Not on file   Intimate Partner Violence: Not on file   Housing Stability: Unknown (5/2/2024)    Housing Stability Vital Sign    • Unable to Pay for Housing in the Last Year: No    • Number of Places Lived in the Last Year: Not on file    • Unstable Housing in the Last Year: No      Medications and Allergies:     Current Outpatient Medications   Medication Sig Dispense Refill   • amLODIPine (NORVASC) 5 mg tablet Take 1 tablet (5 mg total) by mouth daily 90 tablet 1   • aspirin (ECOTRIN LOW STRENGTH) 81 mg EC tablet Take 81 mg by mouth daily     • clopidogrel (PLAVIX) 75 mg tablet Take 1 tablet (75 mg total) by mouth daily 90 tablet 1   • Evolocumab (Repatha) 140 MG/ML SOSY Inject 1 mL (140 mg total) under the skin every 14 (fourteen) days 6 mL 0   • metFORMIN (GLUCOPHAGE) 500 mg tablet Take 1 tablet (500 mg total) by mouth in the morning 90 tablet 1   • methylPREDNISolone 4 MG tablet therapy pack Use as directed on package 21 each 0   • nitroglycerin (NITROSTAT) 0.4 mg SL tablet Place 1 tablet (0.4 mg total) under the tongue every 5 (five) minutes as needed for chest pain 30 tablet  1   • olopatadine HCl (PATADAY) 0.2 % opth drops 1 drop     • OneTouch Verio test strip        No current facility-administered medications for this visit.     Allergies   Allergen Reactions   • Penicillins Hives and Swelling     Tongue swelling   • Simvastatin Myalgia   • Metoprolol Dizziness and Confusion     Sweating, cold hands and feet.    • Praluent [Alirocumab] Dizziness     Bruising     • Lisinopril Cough      Immunizations:     Immunization History   Administered Date(s) Administered   • COVID-19 PFIZER VACCINE 0.3 ML IM 03/31/2021, 04/21/2021, 10/27/2021   • COVID-19 Pfizer Vac BIVALENT Du-sucrose 12 Yr+ IM 10/13/2022   • COVID-19 Pfizer mRNA vacc PF du-sucrose 12 yr and older (Comirnaty) 10/05/2023   • COVID-19 Pfizer vac (Du-sucrose, gray cap) 12 yr+ IM 05/18/2022   • Hep A, adult 03/20/2012, 12/11/2012   • Hep B, Adolescent or Pediatric 05/24/2017   • Hep B, adult 08/10/2016   • INFLUENZA 08/05/2016, 08/18/2017, 09/10/2019, 08/14/2021, 10/05/2022, 10/12/2023   • IPV 03/20/2012   • Influenza Split High Dose Preservative Free IM 09/29/2020, 10/01/2022   • Influenza, seasonal, injectable 08/05/2016, 09/29/2020, 08/14/2021   • Pneumococcal 02/20/2014, 08/05/2016   • Pneumococcal Conjugate 13-Valent 09/26/2019   • Pneumococcal Polysaccharide PPV23 08/05/2016, 08/18/2017   • Tdap 08/05/2016   • Typhoid, Unspecified 10/05/2017, 10/03/2019, 03/16/2022   • Yellow Fever 03/20/2012   • Zoster 08/05/2016   • Zoster Vaccine Recombinant 01/18/2023, 05/22/2023      Health Maintenance:         Topic Date Due   • Hepatitis C Screening  Never done   • Breast Cancer Screening: Mammogram  01/19/2024   • Colorectal Cancer Screening  10/26/2028         Topic Date Due   • IPV Vaccine (2 of 3 - Adult catch-up series) 04/17/2012   • COVID-19 Vaccine (7 - 2023-24 season) 11/30/2023      Medicare Screening Tests and Risk Assessments:         Health Risk Assessment:   Patient rates overall health as very good. Patient feels  that their physical health rating is same. Patient is very satisfied with their life. Eyesight was rated as same. Hearing was rated as same. Patient feels that their emotional and mental health rating is slightly worse. Patients states they are often angry. Patient states they are sometimes unusually tired/fatigued. Pain experienced in the last 7 days has been a lot. Patient's pain rating has been 9/10. Patient states that she has experienced no weight loss or gain in last 6 months.     Fall Risk Screening:   In the past year, patient has experienced: no history of falling in past year      Urinary Incontinence Screening:   Patient has not leaked urine accidently in the last six months.     Home Safety:  Patient does not have trouble with stairs inside or outside of their home. Patient has working smoke alarms and has working carbon monoxide detector. Home safety hazards include: none.     Nutrition:   Current diet is Diabetic.     Medications:   Patient is not currently taking any over-the-counter supplements. Patient is able to manage medications.     Activities of Daily Living (ADLs)/Instrumental Activities of Daily Living (IADLs):   Walk and transfer into and out of bed and chair?: Yes  Dress and groom yourself?: Yes    Bathe or shower yourself?: Yes    Feed yourself? Yes  Do your laundry/housekeeping?: Yes  Manage your money, pay your bills and track your expenses?: Yes  Make your own meals?: Yes    Do your own shopping?: Yes    Advance Care Planning:   Living will: Yes    Durable POA for healthcare: Yes    Advanced directive: Yes    Advanced directive counseling given: Yes    Five wishes given: No    Patient declined ACP directive: Yes    End of Life Decisions reviewed with patient: Yes    Provider agrees with end of life decisions: Yes      Cognitive Screening:   Provider or family/friend/caregiver concerned regarding cognition?: No    PREVENTIVE SCREENINGS      Cardiovascular Screening:    General: Screening  "Not Indicated and History Lipid Disorder      Diabetes Screening:     General: Screening Not Indicated and History Diabetes      Colorectal Cancer Screening:     General: Screening Current      Breast Cancer Screening:     General: Screening Current      Cervical Cancer Screening:    General: Screening Not Indicated      Osteoporosis Screening:    General: Patient Declines      Abdominal Aortic Aneurysm (AAA) Screening:        General: Screening Not Indicated      Lung Cancer Screening:     General: Screening Not Indicated    Screening, Brief Intervention, and Referral to Treatment (SBIRT)    Screening  Typical number of drinks in a day: 0  Typical number of drinks in a week: 0  Interpretation: Low risk drinking behavior.    Other Counseling Topics:   Car/seat belt/driving safety.     No results found.     Physical Exam:     /70 (BP Location: Right arm, Patient Position: Sitting, Cuff Size: Extra-Large)   Pulse 72   Ht 5' 7\" (1.702 m)   Wt 97.8 kg (215 lb 9.6 oz)   SpO2 98%   BMI 33.77 kg/m²     Physical Exam  Vitals and nursing note reviewed.   Constitutional:       General: She is not in acute distress.     Appearance: She is well-developed.   HENT:      Head: Normocephalic and atraumatic.   Eyes:      Conjunctiva/sclera: Conjunctivae normal.   Cardiovascular:      Rate and Rhythm: Normal rate and regular rhythm.      Heart sounds: No murmur heard.  Pulmonary:      Effort: Pulmonary effort is normal. No respiratory distress.      Breath sounds: Normal breath sounds.   Abdominal:      Palpations: Abdomen is soft.      Tenderness: There is no abdominal tenderness.   Musculoskeletal:         General: No swelling.      Cervical back: Neck supple.      Comments: Tenderness lower back.   Skin:     General: Skin is warm and dry.      Capillary Refill: Capillary refill takes less than 2 seconds.   Neurological:      Mental Status: She is alert.   Psychiatric:         Mood and Affect: Mood normal.          Demetrius " MD Latesha

## 2024-05-05 RX ORDER — EVOLOCUMAB 140 MG/ML
1 INJECTION, SOLUTION SUBCUTANEOUS
Qty: 6 ML | Refills: 3 | Status: SHIPPED | OUTPATIENT
Start: 2024-05-05 | End: 2025-03-24

## 2024-06-06 ENCOUNTER — OFFICE VISIT (OUTPATIENT)
Age: 74
End: 2024-06-06
Payer: COMMERCIAL

## 2024-06-06 ENCOUNTER — APPOINTMENT (OUTPATIENT)
Age: 74
End: 2024-06-06
Payer: COMMERCIAL

## 2024-06-06 VITALS
HEIGHT: 67 IN | OXYGEN SATURATION: 98 % | BODY MASS INDEX: 32.77 KG/M2 | WEIGHT: 208.8 LBS | SYSTOLIC BLOOD PRESSURE: 118 MMHG | HEART RATE: 85 BPM | DIASTOLIC BLOOD PRESSURE: 72 MMHG

## 2024-06-06 DIAGNOSIS — Z11.59 NEED FOR HEPATITIS C SCREENING TEST: ICD-10-CM

## 2024-06-06 DIAGNOSIS — Z13.820 SCREENING FOR OSTEOPOROSIS: ICD-10-CM

## 2024-06-06 DIAGNOSIS — Z78.0 ASYMPTOMATIC MENOPAUSAL STATE: ICD-10-CM

## 2024-06-06 DIAGNOSIS — E11.22 TYPE 2 DIABETES MELLITUS WITH STAGE 3A CHRONIC KIDNEY DISEASE, WITHOUT LONG-TERM CURRENT USE OF INSULIN (HCC): Primary | ICD-10-CM

## 2024-06-06 DIAGNOSIS — E78.5 HYPERLIPIDEMIA, UNSPECIFIED HYPERLIPIDEMIA TYPE: ICD-10-CM

## 2024-06-06 DIAGNOSIS — N18.31 TYPE 2 DIABETES MELLITUS WITH STAGE 3A CHRONIC KIDNEY DISEASE, WITHOUT LONG-TERM CURRENT USE OF INSULIN (HCC): ICD-10-CM

## 2024-06-06 DIAGNOSIS — M25.551 RIGHT HIP PAIN: ICD-10-CM

## 2024-06-06 DIAGNOSIS — M54.50 ACUTE RIGHT-SIDED LOW BACK PAIN WITHOUT SCIATICA: ICD-10-CM

## 2024-06-06 DIAGNOSIS — N18.31 TYPE 2 DIABETES MELLITUS WITH STAGE 3A CHRONIC KIDNEY DISEASE, WITHOUT LONG-TERM CURRENT USE OF INSULIN (HCC): Primary | ICD-10-CM

## 2024-06-06 DIAGNOSIS — I25.10 CORONARY ARTERY DISEASE INVOLVING NATIVE CORONARY ARTERY OF NATIVE HEART WITHOUT ANGINA PECTORIS: ICD-10-CM

## 2024-06-06 DIAGNOSIS — E11.22 TYPE 2 DIABETES MELLITUS WITH STAGE 3A CHRONIC KIDNEY DISEASE, WITHOUT LONG-TERM CURRENT USE OF INSULIN (HCC): ICD-10-CM

## 2024-06-06 LAB — HCV AB SER QL: NORMAL

## 2024-06-06 PROCEDURE — 72100 X-RAY EXAM L-S SPINE 2/3 VWS: CPT

## 2024-06-06 PROCEDURE — 99214 OFFICE O/P EST MOD 30 MIN: CPT | Performed by: FAMILY MEDICINE

## 2024-06-06 PROCEDURE — 73502 X-RAY EXAM HIP UNI 2-3 VIEWS: CPT

## 2024-06-06 PROCEDURE — G2211 COMPLEX E/M VISIT ADD ON: HCPCS | Performed by: FAMILY MEDICINE

## 2024-06-06 PROCEDURE — 83036 HEMOGLOBIN GLYCOSYLATED A1C: CPT

## 2024-06-06 PROCEDURE — 80061 LIPID PANEL: CPT

## 2024-06-06 PROCEDURE — 36415 COLL VENOUS BLD VENIPUNCTURE: CPT

## 2024-06-06 PROCEDURE — 80048 BASIC METABOLIC PNL TOTAL CA: CPT

## 2024-06-06 PROCEDURE — 86803 HEPATITIS C AB TEST: CPT

## 2024-06-06 NOTE — ASSESSMENT & PLAN NOTE
Continue metformin. ADA diet, carb counting, low fat, high fiber intake. Water or low calorie drinks. Aerobic exercise. Weight loss.   Lab Results   Component Value Date    HGBA1C 6.2 (H) 04/26/2024

## 2024-06-06 NOTE — PROGRESS NOTES
Ambulatory Visit  Name: Pamela Bazan      : 1950      MRN: [id number]  Encounter Provider: Demetrius Charlton MD  Encounter Date: 2024   Encounter department: [facility]    Assessment & Plan   1. Type 2 diabetes mellitus with stage 3a chronic kidney disease, without long-term current use of insulin (HCC)  Assessment & Plan:  Continue metformin. ADA diet, carb counting, low fat, high fiber intake. Water or low calorie drinks. Aerobic exercise. Weight loss.   Lab Results   Component Value Date    HGBA1C 6.2 (H) 2024     2. Coronary artery disease involving native coronary artery of native heart without angina pectoris  Assessment & Plan:  Sees cardiology. Rapatha, nitro, and plavix.   3. Hyperlipidemia, unspecified hyperlipidemia type  Assessment & Plan:  Rapatha. Low cholesterol diet. Encourage vegetables, fruit, and whole grains. Exercise.    4. Need for hepatitis C screening test  -     Hepatitis C Antibody; Future  5. Acute right-sided low back pain without sciatica  Assessment & Plan:  Continue NSAID and P.T. Will get XR of right hip and lumbar spine.  Orders:  -     XR spine lumbar 2 or 3 views injury; Future; Expected date: 2024  -     XR hip/pelv 2-3 vws right if performed; Future; Expected date: 2024  6. Screening for osteoporosis  -     DXA bone density spine hip and pelvis; Future; Expected date: 2024  7. Right hip pain  -     XR hip/pelv 2-3 vws right if performed; Future; Expected date: 2024  8. Asymptomatic menopausal state  -     DXA bone density spine hip and pelvis; Future; Expected date: 2024       History of Present Illness       Pamela Bazan is here for chronic conditions f/u. Denies chest pain, shortness of breath, headache, or visual symptoms. Reviewed and updated PMHx, PSHx, Family Hx, Allergies, and Meds.  Right lower back pain, right hip pain. Pain for months. Not taking pain meds. Pain is intermittent  "8/10. No bowel or urinary incontinence. Getting P.T. for back and hip.    Diabetic Foot Exam    Patient's shoes and socks removed.    Right Foot/Ankle   Right Foot Inspection  Skin Exam: skin normal. Skin not intact, no dry skin, no warmth, no callus, no erythema, no maceration, no abnormal color, no pre-ulcer, no ulcer and no callus.     Toe Exam: ROM and strength within normal limits.  no right toe deformity    Sensory   Monofilament testing: intact    Vascular  Capillary refills: < 3 seconds  The right DP pulse is 2+.     Left Foot/Ankle  Left Foot Inspection  Skin Exam: skin normal. Skin not intact, no dry skin, no warmth, no erythema, no maceration, normal color, no pre-ulcer, no ulcer and no callus.     Toe Exam: ROM and strength within normal limits. No left toe deformity.     Sensory   Monofilament testing: intact    Vascular  Capillary refills: < 3 seconds  The left DP pulse is 2+.     Assign Risk Category  No deformity present  No loss of protective sensation  No weak pulses  Risk: 0          Review of Systems   Constitutional:  Negative for fatigue.   Respiratory:  Negative for shortness of breath.    Cardiovascular:  Negative for chest pain.   Gastrointestinal:  Negative for abdominal pain, constipation and diarrhea.   Genitourinary:  Negative for dysuria.   Musculoskeletal:  Positive for back pain.        Tender left hip.   Neurological:  Negative for dizziness.       Objective     /72 (BP Location: Left arm, Patient Position: Sitting, Cuff Size: Standard)   Pulse 85   Ht 5' 7\" (1.702 m)   Wt 94.7 kg (208 lb 12.8 oz)   SpO2 98%   BMI 32.70 kg/m²     Physical Exam  Vitals and nursing note reviewed.   Constitutional:       Appearance: She is well-developed.   HENT:      Head: Normocephalic and atraumatic.   Eyes:      Conjunctiva/sclera: Conjunctivae normal.   Cardiovascular:      Rate and Rhythm: Normal rate and regular rhythm.      Pulses: no weak pulses.           Dorsalis pedis pulses are " 2+ on the right side and 2+ on the left side.      Heart sounds: No murmur heard.  Pulmonary:      Breath sounds: Normal breath sounds.   Abdominal:      Palpations: Abdomen is soft.   Musculoskeletal:         General: Tenderness (Lower back and right hip) present.      Cervical back: Neck supple.        Feet:       Comments: Tender low   Feet:      Right foot:      Skin integrity: No ulcer, skin breakdown, erythema, warmth, callus or dry skin.      Left foot:      Skin integrity: No ulcer, skin breakdown, erythema, warmth, callus or dry skin.   Skin:     General: Skin is warm and dry.   Neurological:      Mental Status: She is alert.   Psychiatric:         Mood and Affect: Mood normal.       Administrative Statements

## 2024-06-07 LAB
ANION GAP SERPL CALCULATED.3IONS-SCNC: 9 MMOL/L (ref 4–13)
BUN SERPL-MCNC: 9 MG/DL (ref 5–25)
CALCIUM SERPL-MCNC: 9.1 MG/DL (ref 8.4–10.2)
CHLORIDE SERPL-SCNC: 103 MMOL/L (ref 96–108)
CHOLEST SERPL-MCNC: 190 MG/DL
CO2 SERPL-SCNC: 28 MMOL/L (ref 21–32)
CREAT SERPL-MCNC: 1.02 MG/DL (ref 0.6–1.3)
EST. AVERAGE GLUCOSE BLD GHB EST-MCNC: 134 MG/DL
GFR SERPL CREATININE-BSD FRML MDRD: 53 ML/MIN/1.73SQ M
GLUCOSE SERPL-MCNC: 114 MG/DL (ref 65–140)
HBA1C MFR BLD: 6.3 %
HDLC SERPL-MCNC: 70 MG/DL
LDLC SERPL CALC-MCNC: 100 MG/DL (ref 0–100)
NONHDLC SERPL-MCNC: 120 MG/DL
POTASSIUM SERPL-SCNC: 4.1 MMOL/L (ref 3.5–5.3)
SODIUM SERPL-SCNC: 140 MMOL/L (ref 135–147)
TRIGL SERPL-MCNC: 99 MG/DL

## 2024-06-11 ENCOUNTER — TELEPHONE (OUTPATIENT)
Age: 74
End: 2024-06-11

## 2024-06-11 NOTE — TELEPHONE ENCOUNTER
Received call from pt asking what otc medications she can take for chronic back pain and occasionally headaches. She stated she read somewhere that she cannot take Tylenol. Please review and advise.

## 2024-07-09 ENCOUNTER — TELEPHONE (OUTPATIENT)
Age: 74
End: 2024-07-09

## 2024-07-09 NOTE — TELEPHONE ENCOUNTER
Called pt's  Diego and spoke with him in regards to his wife hospital follow-up appointment. Pt's  said that he wanted to cancel this appointment because he says his wife is doing fine and feels like she does not need to come to Nephrology. I told him that this a hospital follow- up appointment to make sure everything was ok with wife's kidneys. Pt continued saying that he wanted to cancel this appointment and will follow with her pcp.       Appointment is canceled.

## 2024-07-09 NOTE — TELEPHONE ENCOUNTER
Patient's  called in asking for information on his wife's hospital follow up scheduled for 7/18.    Him and his wife are confused as to why they have the appointment. They are not sure who sent the Nephrologist to see his wife and they want someone to call back with an explanation.

## 2024-07-12 DIAGNOSIS — Z95.5 S/P DRUG ELUTING CORONARY STENT PLACEMENT: ICD-10-CM

## 2024-07-12 RX ORDER — NITROGLYCERIN 0.4 MG/1
0.4 TABLET SUBLINGUAL
Qty: 25 TABLET | Refills: 5 | Status: SHIPPED | OUTPATIENT
Start: 2024-07-12

## 2024-07-12 NOTE — TELEPHONE ENCOUNTER
Reason for call:   [x] Refill   [] Prior Auth  [] Other:     Office:   [] PCP/Provider -   [x] Specialty/Provider - Card  Valerie Alvarez    Medication: Nitroglycerin    Dose/Frequency: 0.4 mg Q 5 minutes PRN    Quantity: 25    Pharmacy: [facility]    Does the patient have enough for 3 days?   [] Yes   [x] No - Send as HP to POD

## 2024-07-17 ENCOUNTER — TELEPHONE (OUTPATIENT)
Age: 74
End: 2024-07-17

## 2024-07-17 NOTE — TELEPHONE ENCOUNTER
Moved appt from Dr. Charlton to Wendie Yarbrough for 945 but her and her  will come together and seen

## 2024-08-08 ENCOUNTER — OFFICE VISIT (OUTPATIENT)
Age: 74
End: 2024-08-08
Payer: COMMERCIAL

## 2024-08-08 VITALS
BODY MASS INDEX: 34.28 KG/M2 | OXYGEN SATURATION: 98 % | WEIGHT: 218.4 LBS | DIASTOLIC BLOOD PRESSURE: 90 MMHG | SYSTOLIC BLOOD PRESSURE: 132 MMHG | HEART RATE: 70 BPM | HEIGHT: 67 IN

## 2024-08-08 DIAGNOSIS — E78.5 HYPERLIPIDEMIA, UNSPECIFIED HYPERLIPIDEMIA TYPE: ICD-10-CM

## 2024-08-08 DIAGNOSIS — N18.31 TYPE 2 DIABETES MELLITUS WITH STAGE 3A CHRONIC KIDNEY DISEASE, WITHOUT LONG-TERM CURRENT USE OF INSULIN (HCC): Primary | ICD-10-CM

## 2024-08-08 DIAGNOSIS — E11.22 TYPE 2 DIABETES MELLITUS WITH STAGE 3A CHRONIC KIDNEY DISEASE, WITHOUT LONG-TERM CURRENT USE OF INSULIN (HCC): Primary | ICD-10-CM

## 2024-08-08 DIAGNOSIS — K21.9 GASTROESOPHAGEAL REFLUX DISEASE WITHOUT ESOPHAGITIS: ICD-10-CM

## 2024-08-08 DIAGNOSIS — Z95.5 S/P DRUG ELUTING CORONARY STENT PLACEMENT: ICD-10-CM

## 2024-08-08 DIAGNOSIS — I25.10 CORONARY ARTERY DISEASE INVOLVING NATIVE CORONARY ARTERY OF NATIVE HEART WITHOUT ANGINA PECTORIS: ICD-10-CM

## 2024-08-08 PROCEDURE — 99214 OFFICE O/P EST MOD 30 MIN: CPT | Performed by: FAMILY MEDICINE

## 2024-08-08 PROCEDURE — G2211 COMPLEX E/M VISIT ADD ON: HCPCS | Performed by: FAMILY MEDICINE

## 2024-08-08 RX ORDER — CLOPIDOGREL BISULFATE 75 MG/1
75 TABLET ORAL DAILY
Qty: 90 TABLET | Refills: 1 | Status: SHIPPED | OUTPATIENT
Start: 2024-08-08

## 2024-08-08 RX ORDER — AMLODIPINE BESYLATE 5 MG/1
5 TABLET ORAL DAILY
Qty: 90 TABLET | Refills: 1 | Status: SHIPPED | OUTPATIENT
Start: 2024-08-08

## 2024-08-08 NOTE — PROGRESS NOTES
Ambulatory Visit  Name: Pamela Bazan      : 1950      MRN: [id number]  Encounter Provider: Demetrius Charlton MD  Encounter Date: 2024   Encounter department: [facility]    Assessment & Plan   1. Type 2 diabetes mellitus with stage 3a chronic kidney disease, without long-term current use of insulin (HCC)  Assessment & Plan:  ADA diet, carb counting, low fat, high fiber intake. Water or low calorie drinks. Aerobic exercise. Weight loss. Continue metformin.    Lab Results   Component Value Date    HGBA1C 6.3 (H) 2024     Orders:  -     Basic metabolic panel; Future; Expected date: 2024  -     Hemoglobin A1C; Future; Expected date: 2024  2. Coronary artery disease involving native coronary artery of native heart without angina pectoris  Assessment & Plan:  Continue ASA, repatha, NTG.   3. Hyperlipidemia, unspecified hyperlipidemia type  Assessment & Plan:  Low cholesterol diet. Encourage vegetables, fruit, and whole grains. Exercise.  Continue repatha.    Orders:  -     Lipid panel; Future  4. Gastroesophageal reflux disease without esophagitis  Assessment & Plan:  Lifestyle changes recommended to reduce symptoms. Avoiding acidic foods, spicy foods, and high fat foods. Losing weight. Elevate head during sleep if needed.         History of Present Illness       Pamela Bazan is here for chronic conditions f/u. Denies chest pain, shortness of breath, headache, or visual symptoms. Reviewed and updated PMHx, PSHx, Family Hx, Allergies, and Meds.  Eating healthy. Exercising.         Review of Systems   Constitutional:  Negative for fatigue.   Respiratory:  Negative for shortness of breath.    Cardiovascular:  Negative for chest pain.   Gastrointestinal:  Negative for abdominal pain, constipation and diarrhea.   Genitourinary:  Negative for dysuria.   Neurological:  Negative for dizziness.       Objective     /90 (BP Location: Left arm, Patient  "Position: Sitting, Cuff Size: Standard)   Pulse 70   Ht 5' 7\" (1.702 m)   Wt 99.1 kg (218 lb 6.4 oz)   SpO2 98%   BMI 34.21 kg/m²     Physical Exam  Vitals and nursing note reviewed.   Constitutional:       Appearance: She is well-developed.   HENT:      Head: Normocephalic and atraumatic.   Eyes:      Conjunctiva/sclera: Conjunctivae normal.   Cardiovascular:      Rate and Rhythm: Normal rate and regular rhythm.      Heart sounds: No murmur heard.  Pulmonary:      Breath sounds: Normal breath sounds.   Abdominal:      Palpations: Abdomen is soft.   Musculoskeletal:      Cervical back: Neck supple.   Skin:     General: Skin is warm and dry.   Neurological:      Mental Status: She is alert.   Psychiatric:         Mood and Affect: Mood normal.       Administrative Statements           "

## 2024-08-08 NOTE — ASSESSMENT & PLAN NOTE
Low cholesterol diet. Encourage vegetables, fruit, and whole grains. Exercise.  Continue repatha.

## 2024-08-08 NOTE — ASSESSMENT & PLAN NOTE
ADA diet, carb counting, low fat, high fiber intake. Water or low calorie drinks. Aerobic exercise. Weight loss. Continue metformin.    Lab Results   Component Value Date    HGBA1C 6.3 (H) 06/06/2024

## 2024-09-05 ENCOUNTER — TELEPHONE (OUTPATIENT)
Age: 74
End: 2024-09-05

## 2024-09-27 ENCOUNTER — APPOINTMENT (OUTPATIENT)
Age: 74
End: 2024-09-27
Payer: COMMERCIAL

## 2024-09-27 DIAGNOSIS — E78.5 HYPERLIPIDEMIA, UNSPECIFIED HYPERLIPIDEMIA TYPE: ICD-10-CM

## 2024-09-27 LAB
CHOLEST SERPL-MCNC: 199 MG/DL
HDLC SERPL-MCNC: 62 MG/DL
LDLC SERPL CALC-MCNC: 116 MG/DL (ref 0–100)
TRIGL SERPL-MCNC: 105 MG/DL

## 2024-09-27 PROCEDURE — 36415 COLL VENOUS BLD VENIPUNCTURE: CPT

## 2024-09-27 PROCEDURE — 80061 LIPID PANEL: CPT

## 2024-10-02 ENCOUNTER — OFFICE VISIT (OUTPATIENT)
Dept: CARDIOLOGY CLINIC | Facility: CLINIC | Age: 74
End: 2024-10-02
Payer: COMMERCIAL

## 2024-10-02 VITALS
DIASTOLIC BLOOD PRESSURE: 80 MMHG | WEIGHT: 207.6 LBS | HEIGHT: 67 IN | HEART RATE: 83 BPM | SYSTOLIC BLOOD PRESSURE: 118 MMHG | BODY MASS INDEX: 32.58 KG/M2

## 2024-10-02 DIAGNOSIS — I10 HYPERTENSION, UNSPECIFIED TYPE: ICD-10-CM

## 2024-10-02 DIAGNOSIS — Z95.5 S/P DRUG ELUTING CORONARY STENT PLACEMENT: ICD-10-CM

## 2024-10-02 DIAGNOSIS — E11.69 TYPE 2 DIABETES MELLITUS WITH OTHER SPECIFIED COMPLICATION, WITHOUT LONG-TERM CURRENT USE OF INSULIN (HCC): ICD-10-CM

## 2024-10-02 DIAGNOSIS — E66.9 OBESITY (BMI 30-39.9): ICD-10-CM

## 2024-10-02 DIAGNOSIS — E78.5 HYPERLIPIDEMIA, UNSPECIFIED HYPERLIPIDEMIA TYPE: ICD-10-CM

## 2024-10-02 DIAGNOSIS — Z78.9 STATIN INTOLERANCE: ICD-10-CM

## 2024-10-02 DIAGNOSIS — I25.10 CORONARY ARTERY DISEASE INVOLVING NATIVE CORONARY ARTERY OF NATIVE HEART WITHOUT ANGINA PECTORIS: Primary | ICD-10-CM

## 2024-10-02 DIAGNOSIS — K21.9 GASTROESOPHAGEAL REFLUX DISEASE, UNSPECIFIED WHETHER ESOPHAGITIS PRESENT: ICD-10-CM

## 2024-10-02 PROCEDURE — 99214 OFFICE O/P EST MOD 30 MIN: CPT | Performed by: INTERNAL MEDICINE

## 2024-10-02 RX ORDER — EVOLOCUMAB 140 MG/ML
1 INJECTION, SOLUTION SUBCUTANEOUS
Qty: 6 ML | Refills: 3 | Status: SHIPPED | OUTPATIENT
Start: 2024-10-02 | End: 2025-08-21

## 2024-10-02 NOTE — PROGRESS NOTES
Cardiology Follow Up Visit       Pamela Bazan   [id number]  1950      HEART & VASCULAR [facility]  [address] [place] [id number]      Primary Care Physician:   Demetrius Charlton MD    Reason for Follow Up Visit / Principal Problem:  Mrs. Pamela Bazan is a 74 y.o. female and never smoker with a PMH significant for but not limited to CAD (nonobstructive, 2019), HLD, HTN, DMII, GERD, and Obesity.  She presents to clinic for routine Follow Up.    Interval History:  Mrs. Bazan was originally seen in clinic on 02/08/24 for Interventional Cardiology Consultation. She'd been at her baseline state of health: independent of adl's, retired as a CNA , though not exercising regularly, when she  had a fall in Nov 2023 .  She stated that she was at home and walking downstairs when she slipped, hit her chest against the railing, and slid down the stairs.  She managed the event conservatively but eventually saw our partner, Dr. Jiang for ongoing CP on 11/16/23.  She described resting and exertional pain, so she underwent a NM MPI that was positive for a fixed anterior infarct with roxanna-infarct ischemia.  She'd previously failed several statin trials, and was started on praluent but noted constipation on the medication and discontinued it.  She also noted increased depression and lightheadedness on metoprolol, so that too was discontinued. She was referred to [facility] but had reservations. She denied any recent hospitalizations, prior cardiac history, family history of early cad, or family history of scd. She stated her last C was at [facility] in [place], [place] and was negative obstructive CAD several year ago.     We proceed with the [facility] on 03/07/24 that was positive for moderate pLAD disease and severe mLCX disease requiring PCI with MARIPOSA x 1 to the LCX. She tolerated the procedure well presented for Hosptial Follow Up on 02/08/24.  She noted stable back pain that was  managed with TYL. She also noted not fully understanding how to take NTG.  She noted HA's and fatigue when she'd tried it in the past for symptoms other than angina.  She had yet to start cardiac rehab but had plans to get a [employer] soon.    Since our last visti, she completed one two sessions of cardiac rehab at [facility]. She'd been able to continue with the Ergometer/Elliptical daily at 30 mins per day or so.  She also noted ongoing back pain with plans for an MRI soon along with PT for help with core exercises. She currently denies any anginal cp, diaphoresis, n/v, sob, palpitations, near-syncope, syncope, orthopnea, pnd, or ble edema.  She notes improved control of her diet and exercise habits. She reports a diet that is somewhat balanced but has room for improvement and exercise that is inconsistent but with plans for plans for improvement. She is otherwise compliant with medications but does not maintain a detailed BP log.  Of note, the patient's cardiac risk factor(s) include: advanced age, hypertension, dyslipidemia, diabetes mellitus, obesity, and sedentary lifestyle.    Assessment & Plan   CAD, s/p PCI with MARIPOSA x 1 to the mLCX on 03/07/24  Patient is asymptomatic of anginal complaints, Pharmacologic NM MPI with a fixed ant defect with roxanna-infarct ischemia, Long discussion about anginal vs resting symptoms, Attending cardiac rehab in April 2024 without complications   on the importance of medication compliance and diet/lifestyle modification, Cont GDMT for CAD on asa / repatha for now, Cont aggressive risk factor modification, Maintain BP log, Cont counseling on diet/lifestyle modification for weight management as well, Will need follow up TTE upon next visit  Monitor closely for symptomatic changes, Record BP/HR and log if symptoms return, ED/Clinic precautions reviewed    HTN, long-standing  No home BP log for review, but clinic SBP in the 110-140 mmHg range  Cont current medication  regimen: amlodipine 5, cont counseling on low-sodium diet, Review the importance of maintaining a home BP log  Monitor BP at home routinely and review log on next visit    HLD, recent FLP:  /  / HDL 62 /  on 09/27/24  Improved , Tolerating med mgmt without adverse reactions on repatha  Cont current medication regimen, cont counseling on diet and lifestyle modification  Monitor FLP as noted above, will reassess on next visit    DM Type II, most recent A1c 6.2 on 06/06/24  Tolerating medical mgmt without progressive symptoms  Check A1c prior to next visit, Cont current medication regimen, Cont counseling on diet and lifestyle changes  Monitor labs as noted above, will reassess on next visit    Obesity, Body mass index is 32.51 kg/m².  Weight has improved significantly with a 70 lb weight loss from 285 to 210 two years ago , now stable over the last year between 210-215 lb ± 5 lbs  Cont to review the importance of diet and lifestyle modification  Will monitor routinely at this time    Discussion / Summary:  Precautions and reasons to call our office or proceed to ER were discussed in detail. Patient expressed understanding and questions were answered. Plan on follow up in-clinic in 4 months.    Office Visit Diagnosis:  1. Coronary artery disease involving native coronary artery of native heart without angina pectoris        2. S/P drug eluting coronary stent placement        3. Statin intolerance        4. Hyperlipidemia, unspecified hyperlipidemia type  Evolocumab (Repatha) 140 MG/ML SOSY      5. Hypertension, unspecified type        6. Type 2 diabetes mellitus with other specified complication, without long-term current use of insulin (HCC)        7. Gastroesophageal reflux disease, unspecified whether esophagitis present        8. Obesity (BMI 30-39.9)                Subjective   Review of Systems   Constitutional: Positive for diaphoresis (may be associated with heat after showers). Negative  for chills, fever and malaise/fatigue.   HENT:  Negative for congestion and sore throat.    Eyes:  Negative for blurred vision and double vision.   Cardiovascular:  Positive for chest pain (twinges, no regular use of NTG, once last week at rest). Negative for claudication, dyspnea on exertion, leg swelling, near-syncope, orthopnea, palpitations, paroxysmal nocturnal dyspnea and syncope.   Respiratory:  Negative for cough, shortness of breath, sleep disturbances due to breathing, snoring and wheezing.    Hematologic/Lymphatic: Negative for bleeding problem. Does not bruise/bleed easily.   Skin:  Negative for itching and rash.   Musculoskeletal:  Positive for back pain (MRI planned). Negative for joint pain and joint swelling.   Gastrointestinal:  Positive for constipation (occasionally, but currently having soft bowel movements). Negative for abdominal pain, diarrhea, nausea and vomiting.   Genitourinary:  Negative for dysuria and hematuria.   Neurological:  Negative for numbness and paresthesias.   Psychiatric/Behavioral:  Negative for depression. The patient is not nervous/anxious.      The following portions of the patient's history were reviewed and updated as appropriate: past medical history, past social history, past family history, past surgical history, current medications, allergies, past surgical history and problem list.  Past Medical History:   Diagnosis Date    CAD (coronary artery disease) 03/07/2024     Social History     Socioeconomic History    Marital status: /Civil Union     Spouse name: Not on file    Number of children: Not on file    Years of education: Not on file    Highest education level: Not on file   Occupational History    Not on file   Tobacco Use    Smoking status: Never     Passive exposure: Never    Smokeless tobacco: Never   Vaping Use    Vaping status: Never Used   Substance and Sexual Activity    Alcohol use: Not Currently    Drug use: Never    Sexual activity: Not  Currently   Other Topics Concern    Not on file   Social History Narrative    Not on file     Social Determinants of Health     Financial Resource Strain: Not on file   Food Insecurity: No Food Insecurity (5/2/2024)    Hunger Vital Sign     Worried About Running Out of Food in the Last Year: Never true     Ran Out of Food in the Last Year: Never true   Transportation Needs: No Transportation Needs (5/2/2024)    PRAPARE - Transportation     Lack of Transportation (Medical): No     Lack of Transportation (Non-Medical): No   Physical Activity: Not on file   Stress: Not on file   Social Connections: Not on file   Intimate Partner Violence: Not on file   Housing Stability: Unknown (5/2/2024)    Housing Stability Vital Sign     Unable to Pay for Housing in the Last Year: No     Number of Times Moved in the Last Year: Not on file     Homeless in the Last Year: No     No family history on file.  Past Surgical History:   Procedure Laterality Date    CARDIAC CATHETERIZATION N/A 3/7/2024    Procedure: Cardiac catheterization;  Surgeon: Valerie Alavrez DO;  Location: [facility];  Service: Cardiology    CARDIAC CATHETERIZATION N/A 3/7/2024    Procedure: Cardiac pci;  Surgeon: Valerie Alvarez DO;  Location: [facility];  Service: Cardiology    CARDIAC CATHETERIZATION N/A 3/7/2024    Procedure: Cardiac Coronary Angiogram;  Surgeon: Valerie Alvarez DO;  Location: [facility];  Service: Cardiology       Current Outpatient Medications:     amLODIPine (NORVASC) 5 mg tablet, Take 1 tablet (5 mg total) by mouth daily, Disp: 90 tablet, Rfl: 1    aspirin (ECOTRIN LOW STRENGTH) 81 mg EC tablet, Take 81 mg by mouth daily, Disp: , Rfl:     clopidogrel (PLAVIX) 75 mg tablet, Take 1 tablet (75 mg total) by mouth daily, Disp: 90 tablet, Rfl: 1    Evolocumab (Repatha) 140 MG/ML SOSY, Inject 1 mL (140 mg total) under the skin every 14 (fourteen) days for 24 doses, Disp: 6 mL, Rfl: 3    metFORMIN (GLUCOPHAGE) 500  "mg tablet, Take 1 tablet (500 mg total) by mouth in the morning, Disp: 90 tablet, Rfl: 1    nitroglycerin (NITROSTAT) 0.4 mg SL tablet, Place 1 tablet (0.4 mg total) under the tongue every 5 (five) minutes as needed for chest pain, Disp: 25 tablet, Rfl: 5    olopatadine HCl (PATADAY) 0.2 % opth drops, 1 drop, Disp: , Rfl:     omeprazole (PriLOSEC) 20 mg delayed release capsule, Take 20 mg by mouth daily, Disp: , Rfl:     OneTouch Verio test strip, , Disp: , Rfl:     methylPREDNISolone 4 MG tablet therapy pack, Use as directed on package (Patient not taking: Reported on 10/2/2024), Disp: 21 each, Rfl: 0  Allergies   Allergen Reactions    Penicillins Hives and Swelling     Tongue swelling    Simvastatin Myalgia    Metoprolol Dizziness and Confusion     Sweating, cold hands and feet.     Praluent [Alirocumab] Dizziness     Bruising      Lisinopril Cough     Patient Active Problem List   Diagnosis    CAD (coronary artery disease)    S/P drug eluting coronary stent placement    Diabetes (HCC)    Diabetic retinopathy (HCC)    Hyperlipidemia, unspecified    Low back pain    Diverticular disease of colon    Adverse effect of HMG-CoA reductase inhibitor    Gastroesophageal reflux disease without esophagitis    Osteopenia    Osteoarthritis    Acute right-sided low back pain without sciatica       Objective     Vitals:    10/02/24 1548   BP: 118/80   BP Location: Left arm   Patient Position: Sitting   Cuff Size: Large   Pulse: 83   Weight: 94.2 kg (207 lb 9.6 oz)   Height: 5' 7\" (1.702 m)       Body mass index is 32.51 kg/m².    Physical Exam  Constitutional:       General: She is not in acute distress.     Appearance: She is obese. She is not toxic-appearing.   HENT:      Head: Normocephalic and atraumatic.      Right Ear: External ear normal.      Left Ear: External ear normal.      Nose: Nose normal.   Eyes:      General: No scleral icterus.        Right eye: No discharge.         Left eye: No discharge.   Neck:      " Vascular: No carotid bruit.   Cardiovascular:      Rate and Rhythm: Normal rate and regular rhythm.      Pulses: Normal pulses.      Heart sounds: No murmur heard.     No gallop.   Pulmonary:      Effort: Pulmonary effort is normal. No respiratory distress.      Breath sounds: No wheezing or rales.   Musculoskeletal:      Cervical back: Neck supple.      Right lower leg: No edema.      Left lower leg: No edema.   Skin:     General: Skin is warm and dry.      Capillary Refill: Capillary refill takes less than 2 seconds.   Neurological:      General: No focal deficit present.      Mental Status: She is alert and oriented to person, place, and time.   Psychiatric:         Mood and Affect: Mood normal.         Behavior: Behavior normal.       Labs:  Lab Results   Component Value Date    K 4.1 06/06/2024    K 4.1 04/26/2024    K 4.2 03/08/2024    K 4.7 05/06/2022     06/06/2024     04/26/2024     03/08/2024     05/06/2022    CO2 28 06/06/2024    CO2 29 04/26/2024    CO2 26 03/08/2024    CO2 28 05/06/2022    BUN 9 06/06/2024    BUN 9 04/26/2024    BUN 9 03/08/2024    BUN 16 05/06/2022    CREATININE 1.02 06/06/2024    CREATININE 1.03 04/26/2024    CREATININE 0.85 03/08/2024    CREATININE 1.30 (H) 05/06/2022    EGFR 53 06/06/2024    EGFR 53 04/26/2024    EGFR 67 03/08/2024    GLUC 114 06/06/2024    GLUC 93 03/08/2024    GLUC 114 03/07/2024    GLUC 120 (H) 05/06/2022    AST 17 04/26/2024    AST 18 05/06/2022    ALT 10 04/26/2024    ALT 10 05/06/2022    TBILI 0.46 04/26/2024    TBILI 0.5 05/06/2022    ALB 3.7 04/26/2024    ALB 3.8 05/06/2022    CALCIUM 9.1 06/06/2024    CALCIUM 8.9 04/26/2024    CALCIUM 8.9 03/08/2024    CALCIUM 9.4 05/06/2022      Lab Results   Component Value Date    WBC 6.79 03/08/2024    HGB 11.1 (L) 03/08/2024    HCT 33.5 (L) 03/08/2024     03/08/2024      Lab Results   Component Value Date    CHOLESTEROL 199 09/27/2024    CHOLESTEROL 190 06/06/2024    CHOLESTEROL 190  "04/26/2024    TRIG 105 09/27/2024    TRIG 99 06/06/2024    TRIG 104 04/26/2024    HDL 62 09/27/2024    HDL 70 06/06/2024    HDL 62 04/26/2024    LDLCALC 116 (H) 09/27/2024    LDLCALC 100 06/06/2024    LDLCALC 107 (H) 04/26/2024     Lab Results   Component Value Date    HGBA1C 6.3 (H) 06/06/2024    HGBA1C 6.2 (H) 04/26/2024    HGBA1C 6.2 12/14/2023    HGBA1C 6.7 01/18/2023    HGBA1C 6.5 06/14/2021    GLUF 96 04/26/2024    GLUF 93 03/08/2024    GLUF 114 (H) 03/07/2024    POCGLU 108 03/08/2024    POCGLU 94 03/07/2024    POCGLU 108 03/07/2024     No results found for: \"TSH\", \"FT4\"  No results found for: \"HSTNI0\", \"HSTNI2\", \"HSTNI4\", \"TROPONINI\"  No results found for: \"BNP\", \"NTBNP\"     Imaging:  I have personally reviewed pertinent reports.  No results found.    Cardiac Studies:  EKG:   Date: 03/07/24, normal sinus rhythm, T wave findings consistent with lat ischemia  Date: 03/07/24, normal sinus rhythm, nonspecific T wave findings  Date: 11/16/23, sinus rhythm with sinus arrhythmia, lvh with repolarization abnormality    Tele:   No results found for this or any previous visit.     Holter:   No results found for this or any previous visit.    Recent Device Check:  No results found for this or any previous visit.    Previous EPS/Interventions:  No results found for this or any previous visit.    Previous Cath/PCI:  Results for testing completed on the encounter of 03/07/24  Study: LHC / PCI  Interpreted Summary    S/P PCI with MARIPOSA x 1 to the Mid LCX     Left Anterior Descending  Prox LAD lesion is 35% stenosed. Not the culprit lesion. HINA flow is 3.    Left Circumflex  Mid Cx lesion is 85% stenosed. Culprit lesion. Culprit for anginal symptoms.HINA flow is 3.    Right Coronary Artery  Prox RCA lesion is 20% stenosed. Not the culprit lesion. HINA flow is 3.    Previous STRESS TEST:  Results for orders placed during the hospital encounter of 11/30/23  NM myocardial perfusion spect (rx stress and/or rest)  Interpretation " Summary    Stress ECG: No ST deviation is noted. There were no arrhythmias during stress. There were no arrhythmias during recovery. . The ECG was not diagnostic due to resting ST-T abnormalities.    Stress Function: Left ventricular function post-stress is normal. Stress ejection fraction is 62 %.    Perfusion: There is a left ventricular perfusion defect that is large in size with moderate reduction in uptake present in the entire anterior location(s) that is partially reversible. There is likely a small amount of artifact caused by breast attenuation.    Stress Combined Conclusion: Left ventricular perfusion is abnormal. There is mild roxanna-infarct ischemia of the entire anterior wall.  Abnormal pharmacologic perfusion scan. There is an infarct of the entire anterior wall with mild roxanna-infarct ischemia.     ECHO:  Results for testing completed on the encounter of 03/07/24  Study: Echo complete w/ contrast if indicated  Interpreted Summary    Left Ventricle: Left ventricular cavity size is normal. Wall thickness is mildly increased. The left ventricular ejection fraction is 50%. Systolic function is low normal. Diastolic function is mildly abnormal, consistent with grade I (abnormal) relaxation.  Left atrial filling pressure is normal.    The following segments are hypokinetic: basal inferior, basal inferolateral, mid inferior, mid inferolateral, mid anterolateral and apical lateral.    All other segments are normal.    Aortic Valve: There is aortic valve sclerosis.    Mitral Valve: There is mild regurgitation.    QUINCY:  No results found for this or any previous visit.    CMR:  No results found for this or any previous visit.    Counseling / Coordination of Care:  During our visit, I spent 20 minutes with the patient, and greater than 55% of this time was spent on counseling and coordination of care, including addressing diagnostic results, prognosis, risks and benefits of treatment options, instructions for  management, patient/family education, importance of treatment compliance, along with risk factor reductions.    Dictation Disclaimer:  This note was completed in part utilizing [employer] direct voice recognition software. Grammatical errors, random word insertion, spelling mistakes, and incomplete sentences may be an occasional consequence of the system secondary to software limitations, ambient noise and hardware issues. At the time of dictation, efforts were made to edit, clarify and /or correct errors.  Please read the chart carefully and recognize, using context, where substitutions have occurred.  If you have any questions or concerns about the context, text or information contained within the body of this dictation, please contact myself, the provider, for further clarification.     Valerie Alvarez, DO 10/02/24

## 2024-10-03 ENCOUNTER — TELEPHONE (OUTPATIENT)
Age: 74
End: 2024-10-03

## 2024-10-03 DIAGNOSIS — E11.22 TYPE 2 DIABETES MELLITUS WITH STAGE 3A CHRONIC KIDNEY DISEASE, WITHOUT LONG-TERM CURRENT USE OF INSULIN (HCC): Primary | ICD-10-CM

## 2024-10-03 DIAGNOSIS — N18.31 TYPE 2 DIABETES MELLITUS WITH STAGE 3A CHRONIC KIDNEY DISEASE, WITHOUT LONG-TERM CURRENT USE OF INSULIN (HCC): Primary | ICD-10-CM

## 2024-10-03 RX ORDER — BLOOD-GLUCOSE METER
EACH MISCELLANEOUS 2 TIMES DAILY
Qty: 1 KIT | Refills: 0 | Status: SHIPPED | OUTPATIENT
Start: 2024-10-03

## 2024-10-04 ENCOUNTER — TELEPHONE (OUTPATIENT)
Age: 74
End: 2024-10-04

## 2024-10-04 NOTE — TELEPHONE ENCOUNTER
PA for Repatha 140 mg/ml SUBMITTED     via    [x]CM-KEY: [id number]  []Surescripts-Case ID #   []Availity-Auth ID # NDC #   []Faxed to plan   []Other website   []Phone call Case ID #     Office notes sent, clinical questions answered. Awaiting determination    Turnaround time for your insurance to make a decision on your Prior Authorization can take 7-21 business days.

## 2024-10-07 NOTE — TELEPHONE ENCOUNTER
PA for Repatha 140 mg /ml  APPROVED     Date(s) approved 8/4/24-10/3/25    Case #[id number]    Patient advised by          [x]Take the Interviewhart Message  [x]Phone call   []LMOM  []L/M to call office as no active Communication consent on file  []Unable to leave detailed message as VM not approved on Communication consent       Pharmacy advised by    []Fax  []Phone call    Approval letter scanned into Media Yes

## 2024-11-06 ENCOUNTER — APPOINTMENT (OUTPATIENT)
Age: 74
End: 2024-11-06
Payer: COMMERCIAL

## 2024-11-06 DIAGNOSIS — N18.31 TYPE 2 DIABETES MELLITUS WITH STAGE 3A CHRONIC KIDNEY DISEASE, WITHOUT LONG-TERM CURRENT USE OF INSULIN (HCC): ICD-10-CM

## 2024-11-06 DIAGNOSIS — R10.13 ABDOMINAL PAIN, EPIGASTRIC: ICD-10-CM

## 2024-11-06 DIAGNOSIS — E11.22 TYPE 2 DIABETES MELLITUS WITH STAGE 3A CHRONIC KIDNEY DISEASE, WITHOUT LONG-TERM CURRENT USE OF INSULIN (HCC): ICD-10-CM

## 2024-11-06 DIAGNOSIS — D64.9 ANEMIA, UNSPECIFIED TYPE: ICD-10-CM

## 2024-11-06 DIAGNOSIS — E78.5 HYPERLIPIDEMIA, UNSPECIFIED HYPERLIPIDEMIA TYPE: ICD-10-CM

## 2024-11-06 LAB
ALBUMIN SERPL BCG-MCNC: 3.8 G/DL (ref 3.5–5)
ALP SERPL-CCNC: 56 U/L (ref 34–104)
ALT SERPL W P-5'-P-CCNC: 11 U/L (ref 7–52)
ANION GAP SERPL CALCULATED.3IONS-SCNC: 11 MMOL/L (ref 4–13)
AST SERPL W P-5'-P-CCNC: 19 U/L (ref 13–39)
BASOPHILS # BLD AUTO: 0.04 THOUSANDS/ÂΜL (ref 0–0.1)
BASOPHILS NFR BLD AUTO: 1 % (ref 0–1)
BILIRUB DIRECT SERPL-MCNC: 0.08 MG/DL (ref 0–0.2)
BILIRUB SERPL-MCNC: 0.28 MG/DL (ref 0.2–1)
BUN SERPL-MCNC: 17 MG/DL (ref 5–25)
CALCIUM SERPL-MCNC: 9.4 MG/DL (ref 8.4–10.2)
CHLORIDE SERPL-SCNC: 102 MMOL/L (ref 96–108)
CHOLEST SERPL-MCNC: 174 MG/DL
CO2 SERPL-SCNC: 26 MMOL/L (ref 21–32)
CREAT SERPL-MCNC: 1.08 MG/DL (ref 0.6–1.3)
EOSINOPHIL # BLD AUTO: 0.15 THOUSAND/ÂΜL (ref 0–0.61)
EOSINOPHIL NFR BLD AUTO: 2 % (ref 0–6)
ERYTHROCYTE [DISTWIDTH] IN BLOOD BY AUTOMATED COUNT: 13.8 % (ref 11.6–15.1)
FERRITIN SERPL-MCNC: 34 NG/ML (ref 11–307)
GFR SERPL CREATININE-BSD FRML MDRD: 49 ML/MIN/1.73SQ M
GLUCOSE P FAST SERPL-MCNC: 100 MG/DL (ref 65–99)
HCT VFR BLD AUTO: 37 % (ref 34.8–46.1)
HDLC SERPL-MCNC: 54 MG/DL
HGB BLD-MCNC: 11.6 G/DL (ref 11.5–15.4)
IMM GRANULOCYTES # BLD AUTO: 0.03 THOUSAND/UL (ref 0–0.2)
IMM GRANULOCYTES NFR BLD AUTO: 0 % (ref 0–2)
LDLC SERPL CALC-MCNC: 96 MG/DL (ref 0–100)
LYMPHOCYTES # BLD AUTO: 1.96 THOUSANDS/ÂΜL (ref 0.6–4.47)
LYMPHOCYTES NFR BLD AUTO: 27 % (ref 14–44)
MCH RBC QN AUTO: 29.4 PG (ref 26.8–34.3)
MCHC RBC AUTO-ENTMCNC: 31.4 G/DL (ref 31.4–37.4)
MCV RBC AUTO: 94 FL (ref 82–98)
MONOCYTES # BLD AUTO: 0.46 THOUSAND/ÂΜL (ref 0.17–1.22)
MONOCYTES NFR BLD AUTO: 6 % (ref 4–12)
NEUTROPHILS # BLD AUTO: 4.61 THOUSANDS/ÂΜL (ref 1.85–7.62)
NEUTS SEG NFR BLD AUTO: 64 % (ref 43–75)
NONHDLC SERPL-MCNC: 120 MG/DL
NRBC BLD AUTO-RTO: 0 /100 WBCS
PLATELET # BLD AUTO: 356 THOUSANDS/UL (ref 149–390)
PMV BLD AUTO: 10 FL (ref 8.9–12.7)
POTASSIUM SERPL-SCNC: 4.5 MMOL/L (ref 3.5–5.3)
PROT SERPL-MCNC: 7.4 G/DL (ref 6.4–8.4)
RBC # BLD AUTO: 3.94 MILLION/UL (ref 3.81–5.12)
SODIUM SERPL-SCNC: 139 MMOL/L (ref 135–147)
TRIGL SERPL-MCNC: 119 MG/DL
TSH SERPL DL<=0.05 MIU/L-ACNC: 1.87 UIU/ML (ref 0.45–4.5)
WBC # BLD AUTO: 7.25 THOUSAND/UL (ref 4.31–10.16)

## 2024-11-06 PROCEDURE — 80061 LIPID PANEL: CPT

## 2024-11-06 PROCEDURE — 82248 BILIRUBIN DIRECT: CPT

## 2024-11-06 PROCEDURE — 84443 ASSAY THYROID STIM HORMONE: CPT

## 2024-11-06 PROCEDURE — 85025 COMPLETE CBC W/AUTO DIFF WBC: CPT

## 2024-11-06 PROCEDURE — 36415 COLL VENOUS BLD VENIPUNCTURE: CPT

## 2024-11-06 PROCEDURE — 83036 HEMOGLOBIN GLYCOSYLATED A1C: CPT

## 2024-11-06 PROCEDURE — 82728 ASSAY OF FERRITIN: CPT

## 2024-11-07 LAB
EST. AVERAGE GLUCOSE BLD GHB EST-MCNC: 143 MG/DL
HBA1C MFR BLD: 6.6 %

## 2024-11-08 ENCOUNTER — RA CDI HCC (OUTPATIENT)
Dept: OTHER | Facility: HOSPITAL | Age: 74
End: 2024-11-08

## 2024-11-08 NOTE — PROGRESS NOTES
[facility] coding opportunities          Chart Reviewed number of suggestions sent to Provider: 1   E11.3291 see 9/26/24 eye exam  Patients Insurance     Medicare Insurance: [employer]

## 2024-11-14 ENCOUNTER — OFFICE VISIT (OUTPATIENT)
Age: 74
End: 2024-11-14
Payer: COMMERCIAL

## 2024-11-14 VITALS
OXYGEN SATURATION: 99 % | RESPIRATION RATE: 18 BRPM | SYSTOLIC BLOOD PRESSURE: 132 MMHG | BODY MASS INDEX: 32.93 KG/M2 | DIASTOLIC BLOOD PRESSURE: 80 MMHG | HEIGHT: 67 IN | HEART RATE: 81 BPM | WEIGHT: 209.8 LBS

## 2024-11-14 DIAGNOSIS — E11.22 TYPE 2 DIABETES MELLITUS WITH STAGE 3A CHRONIC KIDNEY DISEASE, WITHOUT LONG-TERM CURRENT USE OF INSULIN (HCC): Primary | ICD-10-CM

## 2024-11-14 DIAGNOSIS — E78.5 HYPERLIPIDEMIA, UNSPECIFIED HYPERLIPIDEMIA TYPE: ICD-10-CM

## 2024-11-14 DIAGNOSIS — M85.80 OSTEOPENIA, UNSPECIFIED LOCATION: ICD-10-CM

## 2024-11-14 DIAGNOSIS — I25.10 CORONARY ARTERY DISEASE INVOLVING NATIVE CORONARY ARTERY OF NATIVE HEART WITHOUT ANGINA PECTORIS: ICD-10-CM

## 2024-11-14 DIAGNOSIS — N18.31 TYPE 2 DIABETES MELLITUS WITH STAGE 3A CHRONIC KIDNEY DISEASE, WITHOUT LONG-TERM CURRENT USE OF INSULIN (HCC): Primary | ICD-10-CM

## 2024-11-14 PROCEDURE — G2211 COMPLEX E/M VISIT ADD ON: HCPCS | Performed by: FAMILY MEDICINE

## 2024-11-14 PROCEDURE — 99214 OFFICE O/P EST MOD 30 MIN: CPT | Performed by: FAMILY MEDICINE

## 2024-11-14 RX ORDER — B-COMPLEX WITH VITAMIN C
1 TABLET ORAL
Qty: 30 TABLET | Refills: 5 | Status: SHIPPED | OUTPATIENT
Start: 2024-11-14

## 2024-11-14 NOTE — ASSESSMENT & PLAN NOTE
ADA diet, carb counting, low fat, high fiber intake. Water or low calorie drinks. Aerobic exercise. Weight loss. Continue metformin.     Lab Results   Component Value Date    HGBA1C 6.6 (H) 11/06/2024

## 2024-11-14 NOTE — ASSESSMENT & PLAN NOTE
Low cholesterol diet. Encourage vegetables, fruit, and whole grains. Exercise.  Continue repatha.

## 2024-11-14 NOTE — PROGRESS NOTES
Name: Pamela Bazan      : 1950      MRN: [id number]  Encounter Provider: Demetrius Charlton MD  Encounter Date: 2024   Encounter department: [facility]  :  Assessment & Plan  Type 2 diabetes mellitus with stage 3a chronic kidney disease, without long-term current use of insulin (HCC)  ADA diet, carb counting, low fat, high fiber intake. Water or low calorie drinks. Aerobic exercise. Weight loss. Continue metformin.     Lab Results   Component Value Date    HGBA1C 6.6 (H) 2024            Coronary artery disease involving native coronary artery of native heart without angina pectoris  Continue ASA, repatha, and NTG prn.         Hyperlipidemia, unspecified hyperlipidemia type  Low cholesterol diet. Encourage vegetables, fruit, and whole grains. Exercise.  Continue repatha.         Osteopenia, unspecified location  Calcium and vitamin D.                History of Present Illness       Pamela Bazan is here for chronic conditions f/u. Denies chest pain, shortness of breath, headache, or visual symptoms. Reviewed and updated PMHx, PSHx, Family Hx, Allergies, and Meds.  Fair appetite and exercise.   -Comprehensive metabolic panel:   Collection Time: 24  7:48 AM       Result                      Value             Ref Range           Sodium                      139               135 - 147 mm*       Potassium                   4.5               3.5 - 5.3 mm*       Chloride                    102               96 - 108 mmo*       CO2                         26                21 - 32 mmol*       ANION GAP                   11                4 - 13 mmol/L       BUN                         17                5 - 25 mg/dL        Creatinine                  1.08              0.60 - 1.30 *       Glucose, Fasting            100 (H)           65 - 99 mg/dL       Calcium                     9.4               8.4 - 10.2 m*       AST                         19                 13 - 39 U/L         ALT                         11                7 - 52 U/L          Alkaline Phosphatase        56                34 - 104 U/L        Total Protein               7.4               6.4 - 8.4 g/*       Albumin                     3.8               3.5 - 5.0 g/*       Total Bilirubin             0.28              0.20 - 1.00 *       eGFR                        49                ml/min/1.73s*  -Lipid panel:   Collection Time: 11/06/24  7:48 AM       Result                      Value             Ref Range           Cholesterol                 174               See Comment *       Triglycerides               119               See Comment *       HDL, Direct                 54                >=50 mg/dL          LDL Calculated              96                0 - 100 mg/dL       Non-HDL-Chol (CHOL-HDL)     120               mg/dl          -Hemoglobin A1C:   Collection Time: 11/06/24  7:48 AM       Result                      Value             Ref Range           Hemoglobin A1C              6.6 (H)           Normal 4.0-5*       EAG                         143               mg/dl          -CBC and differential:   Collection Time: 11/06/24  7:48 AM       Result                      Value             Ref Range           WBC                         7.25              4.31 - 10.16*       RBC                         3.94              3.81 - 5.12 *       Hemoglobin                  11.6              11.5 - 15.4 *       Hematocrit                  37.0              34.8 - 46.1 %       MCV                         94                82 - 98 fL          MCH                         29.4              26.8 - 34.3 *       MCHC                        31.4              31.4 - 37.4 *       RDW                         13.8              11.6 - 15.1 %       MPV                         10.0              8.9 - 12.7 fL       Platelets                   356               149 - 390 Th*       nRBC                        0                 " /100 WBCs           Segmented %                 64                43 - 75 %           Immature Grans %            0                 0 - 2 %             Lymphocytes %               27                14 - 44 %           Monocytes %                 6                 4 - 12 %            Eosinophils Relative        2                 0 - 6 %             Basophils Relative          1                 0 - 1 %             Absolute Neutrophils        4.61              1.85 - 7.62 *       Absolute Immature Grans     0.03              0.00 - 0.20 *       Absolute Lymphocytes        1.96              0.60 - 4.47 *       Absolute Monocytes          0.46              0.17 - 1.22 *       Eosinophils Absolute        0.15              0.00 - 0.61 *       Basophils Absolute          0.04              0.00 - 0.10 *  -Ferritin:   Collection Time: 11/06/24  7:48 AM       Result                      Value             Ref Range           Ferritin                    34                11 - 307 ng/*  -TSH, 3rd generation:   Collection Time: 11/06/24  7:48 AM       Result                      Value             Ref Range           TSH 3RD GENERATON           1.874             0.450 - 4.50*  -Bilirubin, direct:   Collection Time: 11/06/24  7:48 AM       Result                      Value             Ref Range           Bilirubin, Direct           0.08              0.00 - 0.20 *       Review of Systems   Constitutional:  Negative for fatigue.   Respiratory:  Negative for shortness of breath.    Cardiovascular:  Negative for chest pain.   Gastrointestinal:  Negative for abdominal pain, constipation and diarrhea.   Genitourinary:  Negative for dysuria.   Neurological:  Negative for dizziness.          Objective   /80 (BP Location: Left arm, Patient Position: Sitting, Cuff Size: Standard)   Pulse 81   Resp 18   Ht 5' 7\" (1.702 m)   Wt 95.2 kg (209 lb 12.8 oz)   SpO2 99%   BMI 32.86 kg/m²      Physical Exam  Vitals and nursing note " reviewed.   Constitutional:       Appearance: She is well-developed.   HENT:      Head: Normocephalic and atraumatic.   Eyes:      Conjunctiva/sclera: Conjunctivae normal.   Cardiovascular:      Rate and Rhythm: Normal rate and regular rhythm.      Heart sounds: No murmur heard.  Pulmonary:      Breath sounds: Normal breath sounds.   Abdominal:      Palpations: Abdomen is soft.   Musculoskeletal:      Cervical back: Neck supple.   Skin:     General: Skin is warm and dry.   Neurological:      Mental Status: She is alert.   Psychiatric:         Mood and Affect: Mood normal.

## 2025-01-25 ENCOUNTER — NURSE TRIAGE (OUTPATIENT)
Dept: OTHER | Facility: OTHER | Age: 75
End: 2025-01-25

## 2025-01-25 NOTE — TELEPHONE ENCOUNTER
"Reason for Disposition   [1] Caller has URGENT medicine question about med that PCP or specialist prescribed AND [2] triager unable to answer question    Answer Assessment - Initial Assessment Questions  1. NAME of MEDICINE: \"What medicine(s) are you calling about?\"      Plavix    2. QUESTION: \"What is your question?\" (e.g., double dose of medicine, side effect)      Itchiness since starting her Plavix. This has been ongoing now for months and uncertain if she should continue taking?    3. PRESCRIBER: \"Who prescribed the medicine?\" Reason: if prescribed by specialist, call should be referred to that group.      Dr. Charlton    4. SYMPTOMS: \"Do you have any symptoms?\" If Yes, ask: \"What symptoms are you having?\"  \"How bad are the symptoms (e.g., mild, moderate, severe)      Itching all over her body since starting Plavix. Right sided lower back pain. Black and blue on hands. States all of these symptoms have been present for the last 6 months now since taking Plavix.    Protocols used: Medication Question Call-Adult-      Pt states she just read side effects paperwork that came with her Plavix and  is concerned because she has been having listed side effects for months and now wants to stop the Plavix.    Per Dr. Charlton- should hold the Plavix for now until further options are discussed soon.     Pt made aware and verbalized understanding. Please follow up with pt on Monday to discuss other medication options as alternative to Plavix.     "

## 2025-01-31 NOTE — TELEPHONE ENCOUNTER
called, did speak to Pamela and is having lower back pain, wanted to know what could take, advised can take tylenol otc  Pt verbally understood   
ambulatory

## 2025-02-07 ENCOUNTER — APPOINTMENT (OUTPATIENT)
Age: 75
End: 2025-02-07
Payer: COMMERCIAL

## 2025-02-07 DIAGNOSIS — N18.31 TYPE 2 DIABETES MELLITUS WITH STAGE 3A CHRONIC KIDNEY DISEASE, WITHOUT LONG-TERM CURRENT USE OF INSULIN (HCC): ICD-10-CM

## 2025-02-07 DIAGNOSIS — E11.22 TYPE 2 DIABETES MELLITUS WITH STAGE 3A CHRONIC KIDNEY DISEASE, WITHOUT LONG-TERM CURRENT USE OF INSULIN (HCC): ICD-10-CM

## 2025-02-07 LAB
ANION GAP SERPL CALCULATED.3IONS-SCNC: 5 MMOL/L (ref 4–13)
BUN SERPL-MCNC: 11 MG/DL (ref 5–25)
CALCIUM SERPL-MCNC: 9.5 MG/DL (ref 8.4–10.2)
CHLORIDE SERPL-SCNC: 104 MMOL/L (ref 96–108)
CO2 SERPL-SCNC: 32 MMOL/L (ref 21–32)
CREAT SERPL-MCNC: 0.93 MG/DL (ref 0.6–1.3)
GFR SERPL CREATININE-BSD FRML MDRD: 59 ML/MIN/1.73SQ M
GLUCOSE P FAST SERPL-MCNC: 110 MG/DL (ref 65–99)
POTASSIUM SERPL-SCNC: 4.2 MMOL/L (ref 3.5–5.3)
SODIUM SERPL-SCNC: 141 MMOL/L (ref 135–147)

## 2025-02-07 PROCEDURE — 83036 HEMOGLOBIN GLYCOSYLATED A1C: CPT

## 2025-02-07 PROCEDURE — 36415 COLL VENOUS BLD VENIPUNCTURE: CPT

## 2025-02-07 PROCEDURE — 80048 BASIC METABOLIC PNL TOTAL CA: CPT

## 2025-02-08 LAB
EST. AVERAGE GLUCOSE BLD GHB EST-MCNC: 128 MG/DL
HBA1C MFR BLD: 6.1 %

## 2025-02-10 RX ORDER — LANOLIN ALCOHOL/MO/W.PET/CERES
1 CREAM (GRAM) TOPICAL
COMMUNITY
Start: 2024-11-15

## 2025-02-11 ENCOUNTER — TELEPHONE (OUTPATIENT)
Dept: ADMINISTRATIVE | Facility: OTHER | Age: 75
End: 2025-02-11

## 2025-02-11 NOTE — TELEPHONE ENCOUNTER
Upon review of the In Basket request we were able to locate, review, and update the patient chart as requested for DEXA Scan.    Any additional questions or concerns should be emailed to the Practice Liaisons via the appropriate education email address, please do not reply via In Basket.    Thank you  Ashley Villatoro   [facility]

## 2025-02-11 NOTE — TELEPHONE ENCOUNTER
----- Message from Rebecca ANDERSON sent at 2/11/2025  8:51 AM EST -----  Regarding: Care Gap Request  02/11/25 8:51 AM    Hello, our patient Pamela Bazan has had DEXA Scan completed/performed. Please assist in updating the patient chart by pulling the document from the Media Tab. The date of service is 07/18/2024.     Thank you,  Rebecca Lopez  [facility]

## 2025-02-11 NOTE — TELEPHONE ENCOUNTER
02/11/25 3:42 PM     Chart reviewed for DEXA Scan was/were submitted to the patient's insurance.     Ashley Villatoro   [facility]

## 2025-02-12 ENCOUNTER — RESULTS FOLLOW-UP (OUTPATIENT)
Dept: NEPHROLOGY | Facility: CLINIC | Age: 75
End: 2025-02-12

## 2025-02-13 ENCOUNTER — OFFICE VISIT (OUTPATIENT)
Age: 75
End: 2025-02-13
Payer: COMMERCIAL

## 2025-02-13 VITALS
WEIGHT: 215 LBS | HEIGHT: 67 IN | SYSTOLIC BLOOD PRESSURE: 140 MMHG | RESPIRATION RATE: 18 BRPM | OXYGEN SATURATION: 99 % | BODY MASS INDEX: 33.74 KG/M2 | HEART RATE: 89 BPM | DIASTOLIC BLOOD PRESSURE: 86 MMHG

## 2025-02-13 DIAGNOSIS — N18.31 TYPE 2 DIABETES MELLITUS WITH STAGE 3A CHRONIC KIDNEY DISEASE, WITHOUT LONG-TERM CURRENT USE OF INSULIN (HCC): Primary | ICD-10-CM

## 2025-02-13 DIAGNOSIS — E11.22 TYPE 2 DIABETES MELLITUS WITH STAGE 3A CHRONIC KIDNEY DISEASE, WITHOUT LONG-TERM CURRENT USE OF INSULIN (HCC): Primary | ICD-10-CM

## 2025-02-13 DIAGNOSIS — K21.9 GASTROESOPHAGEAL REFLUX DISEASE WITHOUT ESOPHAGITIS: ICD-10-CM

## 2025-02-13 DIAGNOSIS — I25.10 CORONARY ARTERY DISEASE INVOLVING NATIVE CORONARY ARTERY OF NATIVE HEART WITHOUT ANGINA PECTORIS: ICD-10-CM

## 2025-02-13 DIAGNOSIS — E78.5 HYPERLIPIDEMIA, UNSPECIFIED HYPERLIPIDEMIA TYPE: ICD-10-CM

## 2025-02-13 PROCEDURE — 99214 OFFICE O/P EST MOD 30 MIN: CPT | Performed by: FAMILY MEDICINE

## 2025-02-13 PROCEDURE — G2211 COMPLEX E/M VISIT ADD ON: HCPCS | Performed by: FAMILY MEDICINE

## 2025-02-13 NOTE — ASSESSMENT & PLAN NOTE
ADA diet, carb counting, low fat, high fiber intake. Water or low calorie drinks. Aerobic exercise. Weight loss. Cointinue metformin.  Lab Results   Component Value Date    HGBA1C 6.1 (H) 02/07/2025     Orders:  •  Albumin / creatinine urine ratio; Future  •  Basic metabolic panel; Future  •  Hemoglobin A1C; Future

## 2025-02-13 NOTE — ASSESSMENT & PLAN NOTE
Lifestyle changes recommended to reduce symptoms. Avoiding acidic foods, spicy foods, and high fat foods. Losing weight. Elevate head during sleep if needed.  Continue PPI

## 2025-02-13 NOTE — PROGRESS NOTES
Name: Pamela Bazan      : 1950      MRN: [id number]  Encounter Provider: Demetrius Charlton MD  Encounter Date: 2025   Encounter department: [facility]  :  Assessment & Plan  Type 2 diabetes mellitus with stage 3a chronic kidney disease, without long-term current use of insulin (HCC)  ADA diet, carb counting, low fat, high fiber intake. Water or low calorie drinks. Aerobic exercise. Weight loss. Cointinue metformin.  Lab Results   Component Value Date    HGBA1C 6.1 (H) 2025     Orders:  •  Albumin / creatinine urine ratio; Future  •  Basic metabolic panel; Future  •  Hemoglobin A1C; Future    Coronary artery disease involving native coronary artery of native heart without angina pectoris    Orders:  •  Lipid panel; Future    Hyperlipidemia, unspecified hyperlipidemia type  Low cholesterol diet. Encourage vegetables, fruit, and whole grains. Exercise.  Continue repatha.  Orders:  •  Lipid panel; Future    Gastroesophageal reflux disease without esophagitis  Lifestyle changes recommended to reduce symptoms. Avoiding acidic foods, spicy foods, and high fat foods. Losing weight. Elevate head during sleep if needed.  Continue PPI              History of Present Illness     Pamela Bazan is here for chronic conditions f/u. Pt. had labs done prior to today's visit which included Recent Results. Denies chest pain, shortness of breath, headache, or visual symptoms. Reviewed and updated PMHx, PSHx, Family Hx, Allergies, and Meds.  Eating fair. Walking and excising. . Compliant with meds.  -Basic metabolic panel:   Collection Time: 25  9:53 AM       Result                      Value             Ref Range           Sodium                      141               135 - 147 mm*       Potassium                   4.2               3.5 - 5.3 mm*       Chloride                    104               96 - 108 mmo*       CO2                         32                21 - 32  "mmol*       ANION GAP                   5                 4 - 13 mmol/L       BUN                         11                5 - 25 mg/dL        Creatinine                  0.93              0.60 - 1.30 *       Glucose, Fasting            110 (H)           65 - 99 mg/dL       Calcium                     9.5               8.4 - 10.2 m*       eGFR                        59                ml/min/1.73s*  -Hemoglobin A1C:   Collection Time: 02/07/25  9:53 AM       Result                      Value             Ref Range           Hemoglobin A1C              6.1 (H)           Normal 4.0-5*       EAG                         128               mg/dl               Review of Systems   Constitutional:  Negative for fatigue.   Respiratory:  Negative for shortness of breath.    Cardiovascular:  Negative for chest pain.   Gastrointestinal:  Negative for abdominal pain, constipation and diarrhea.   Genitourinary:  Negative for dysuria.   Neurological:  Negative for dizziness.       Objective   /86 (BP Location: Right arm, Patient Position: Sitting, Cuff Size: Standard)   Pulse 89   Resp 18   Ht 5' 7\" (1.702 m)   Wt 97.5 kg (215 lb)   SpO2 99%   BMI 33.67 kg/m²      Physical Exam  Vitals and nursing note reviewed.   Constitutional:       Appearance: She is well-developed.   HENT:      Head: Normocephalic and atraumatic.   Eyes:      Conjunctiva/sclera: Conjunctivae normal.   Cardiovascular:      Rate and Rhythm: Normal rate and regular rhythm.      Heart sounds: No murmur heard.  Pulmonary:      Breath sounds: Normal breath sounds.   Abdominal:      Palpations: Abdomen is soft.   Musculoskeletal:      Cervical back: Neck supple.   Skin:     General: Skin is warm and dry.   Neurological:      Mental Status: She is alert.   Psychiatric:         Mood and Affect: Mood normal.         "

## 2025-02-20 ENCOUNTER — TELEPHONE (OUTPATIENT)
Age: 75
End: 2025-02-20

## 2025-02-20 ENCOUNTER — TRANSITIONAL CARE MANAGEMENT (OUTPATIENT)
Age: 75
End: 2025-02-20

## 2025-02-20 DIAGNOSIS — K59.00 CONSTIPATION, UNSPECIFIED CONSTIPATION TYPE: Primary | ICD-10-CM

## 2025-02-20 RX ORDER — DOCUSATE SODIUM 100 MG/1
100 CAPSULE, LIQUID FILLED ORAL DAILY PRN
Qty: 30 CAPSULE | Refills: 0 | Status: SHIPPED | OUTPATIENT
Start: 2025-02-20

## 2025-02-20 NOTE — TELEPHONE ENCOUNTER
Pt called in she states she is experiencing constipation she was was discharged from hospital today but was not given any medication for constipation Please Advise

## 2025-02-24 RX ORDER — VALACYCLOVIR HYDROCHLORIDE 1 G/1
1000 TABLET, FILM COATED ORAL
COMMUNITY
Start: 2025-02-20

## 2025-02-24 RX ORDER — PREDNISONE 20 MG/1
20 TABLET ORAL
COMMUNITY
Start: 2025-02-20

## 2025-02-24 RX ORDER — OFLOXACIN 3 MG/ML
1 SOLUTION/ DROPS OPHTHALMIC 4 TIMES DAILY
COMMUNITY
Start: 2025-02-17 | End: 2025-02-24

## 2025-02-24 RX ORDER — PANTOPRAZOLE SODIUM 40 MG/1
40 TABLET, DELAYED RELEASE ORAL DAILY
COMMUNITY
Start: 2025-02-20

## 2025-02-27 ENCOUNTER — OFFICE VISIT (OUTPATIENT)
Age: 75
End: 2025-02-27
Payer: COMMERCIAL

## 2025-02-27 VITALS
WEIGHT: 209.2 LBS | OXYGEN SATURATION: 99 % | HEIGHT: 67 IN | DIASTOLIC BLOOD PRESSURE: 90 MMHG | BODY MASS INDEX: 32.83 KG/M2 | RESPIRATION RATE: 18 BRPM | SYSTOLIC BLOOD PRESSURE: 140 MMHG | HEART RATE: 72 BPM

## 2025-02-27 DIAGNOSIS — I65.23 ATHEROSCLEROSIS OF BOTH CAROTID ARTERIES: ICD-10-CM

## 2025-02-27 DIAGNOSIS — G51.0 BELL'S PALSY: Primary | ICD-10-CM

## 2025-02-27 DIAGNOSIS — E11.22 TYPE 2 DIABETES MELLITUS WITH STAGE 3A CHRONIC KIDNEY DISEASE, WITHOUT LONG-TERM CURRENT USE OF INSULIN (HCC): ICD-10-CM

## 2025-02-27 DIAGNOSIS — N18.31 TYPE 2 DIABETES MELLITUS WITH STAGE 3A CHRONIC KIDNEY DISEASE, WITHOUT LONG-TERM CURRENT USE OF INSULIN (HCC): ICD-10-CM

## 2025-02-27 PROCEDURE — 99496 TRANSJ CARE MGMT HIGH F2F 7D: CPT | Performed by: FAMILY MEDICINE

## 2025-02-27 NOTE — PROGRESS NOTES
Transition of Care Visit  Name: Pamela Bazan      : 1950      MRN: [id number]  Encounter Provider: Demetrius Charlton MD  Encounter Date: 2025   Encounter department: [facility]    Assessment & Plan  Bell's palsy  Finish steroids. Reviewed all tests. All meds reviewed.       Atherosclerosis of both carotid arteries  Continue repatha and aspirin.       Type 2 diabetes mellitus with stage 3a chronic kidney disease, without long-term current use of insulin (HCC)  ADA diet, carb counting, low fat, high fiber intake. Water or low calorie drinks. Aerobic exercise. Weight loss. Continue metformin.  Lab Results   Component Value Date    HGBA1C 6.1 (H) 2025               History of Present Illness     Transitional Care Management Review:   Pamela Bazan is a 74 y.o. female here for TCM follow up.     During the TCM phone call patient stated:  TCM Call     Date and time call was made  2025 12:07 PM    Hospital care reviewed  Records reviewed    Patient was hospitialized at  Other (comment)    Comment  [facility]    Date of Admission  25    Date of discharge  25    Diagnosis  Bell's Palsy    Disposition  Home    Were the patients medications reviewed and updated  Yes    Current Symptoms  None      TCM Call     Post hospital issues  None    Should patient be enrolled in anticoag monitoring?  No    Scheduled for follow up?  Yes    Did you obtain your prescribed medications  Yes    Do you need help managing your prescriptions or medications  No    Is transportation to your appointment needed  No    I have advised the patient to call PCP with any new or worsening symptoms  JULIAN Fernandez          Pamela Bazan is here for transition visit. Had left partial Alpha palsy. D/C on steroinds. MRI of brain neg for CVA. Echo showed ejection fraction of 60, Carotid duplex no significant stenosis. Diagnosed with bilateral carotid arterial atherosclerosis.  "Telemetry unremarkable showing sinus rhythm. Discharged on aspirin and repatha.  -Basic metabolic panel:   Collection Time: 02/07/25  9:53 AM       Result                      Value             Ref Range           Sodium                      141               135 - 147 mm*       Potassium                   4.2               3.5 - 5.3 mm*       Chloride                    104               96 - 108 mmo*       CO2                         32                21 - 32 mmol*       ANION GAP                   5                 4 - 13 mmol/L       BUN                         11                5 - 25 mg/dL        Creatinine                  0.93              0.60 - 1.30 *       Glucose, Fasting            110 (H)           65 - 99 mg/dL       Calcium                     9.5               8.4 - 10.2 m*       eGFR                        59                ml/min/1.73s*  -Hemoglobin A1C:   Collection Time: 02/07/25  9:53 AM       Result                      Value             Ref Range           Hemoglobin A1C              6.1 (H)           Normal 4.0-5*       EAG                         128               mg/dl               Review of Systems   Constitutional:  Negative for fatigue.   Respiratory:  Negative for shortness of breath.    Cardiovascular:  Negative for chest pain.   Gastrointestinal:  Negative for abdominal pain, constipation and diarrhea.   Genitourinary:  Negative for dysuria.   Neurological:  Positive for weakness (Left face). Negative for dizziness.     Objective   /90 (BP Location: Left arm, Patient Position: Sitting, Cuff Size: Standard)   Pulse 72   Resp 18   Ht 5' 7\" (1.702 m)   Wt 94.9 kg (209 lb 3.2 oz)   SpO2 99%   BMI 32.77 kg/m²     Physical Exam  Vitals and nursing note reviewed.   Constitutional:       Appearance: She is well-developed.   HENT:      Head: Normocephalic and atraumatic.   Eyes:      Conjunctiva/sclera: Conjunctivae normal.   Cardiovascular:      Rate and Rhythm: Normal rate and " regular rhythm.      Heart sounds: No murmur heard.  Pulmonary:      Breath sounds: Normal breath sounds.   Abdominal:      Palpations: Abdomen is soft.   Musculoskeletal:      Cervical back: Neck supple.   Skin:     General: Skin is warm and dry.   Neurological:      Mental Status: She is alert.      Cranial Nerves: Cranial nerve deficit (Left facial nerve.) present.      Sensory: No sensory deficit.      Motor: No weakness.      Coordination: Coordination normal.      Gait: Gait normal.   Psychiatric:         Mood and Affect: Mood normal.

## 2025-02-27 NOTE — ASSESSMENT & PLAN NOTE
ADA diet, carb counting, low fat, high fiber intake. Water or low calorie drinks. Aerobic exercise. Weight loss. Continue metformin.  Lab Results   Component Value Date    HGBA1C 6.1 (H) 02/07/2025

## 2025-03-11 ENCOUNTER — TELEPHONE (OUTPATIENT)
Age: 75
End: 2025-03-11

## 2025-03-11 NOTE — TELEPHONE ENCOUNTER
Patient called in and is requesting a call to discuss her ongoing pain on her left side or if another medication can be prescribed. Please advise

## 2025-03-28 ENCOUNTER — APPOINTMENT (OUTPATIENT)
Age: 75
End: 2025-03-28
Payer: COMMERCIAL

## 2025-03-28 DIAGNOSIS — E78.5 HYPERLIPIDEMIA, UNSPECIFIED HYPERLIPIDEMIA TYPE: ICD-10-CM

## 2025-03-28 DIAGNOSIS — I25.10 CORONARY ARTERY DISEASE INVOLVING NATIVE CORONARY ARTERY OF NATIVE HEART WITHOUT ANGINA PECTORIS: ICD-10-CM

## 2025-03-28 DIAGNOSIS — E11.22 TYPE 2 DIABETES MELLITUS WITH STAGE 3A CHRONIC KIDNEY DISEASE, WITHOUT LONG-TERM CURRENT USE OF INSULIN (HCC): ICD-10-CM

## 2025-03-28 DIAGNOSIS — N18.31 TYPE 2 DIABETES MELLITUS WITH STAGE 3A CHRONIC KIDNEY DISEASE, WITHOUT LONG-TERM CURRENT USE OF INSULIN (HCC): ICD-10-CM

## 2025-03-28 LAB
ANION GAP SERPL CALCULATED.3IONS-SCNC: 6 MMOL/L (ref 4–13)
BUN SERPL-MCNC: 15 MG/DL (ref 5–25)
CALCIUM SERPL-MCNC: 9.3 MG/DL (ref 8.4–10.2)
CHLORIDE SERPL-SCNC: 107 MMOL/L (ref 96–108)
CHOLEST SERPL-MCNC: 209 MG/DL (ref ?–200)
CO2 SERPL-SCNC: 30 MMOL/L (ref 21–32)
CREAT SERPL-MCNC: 1.05 MG/DL (ref 0.6–1.3)
CREAT UR-MCNC: 36.3 MG/DL
GFR SERPL CREATININE-BSD FRML MDRD: 51 ML/MIN/1.73SQ M
GLUCOSE P FAST SERPL-MCNC: 100 MG/DL (ref 65–99)
HDLC SERPL-MCNC: 57 MG/DL
LDLC SERPL CALC-MCNC: 124 MG/DL (ref 0–100)
MICROALBUMIN UR-MCNC: <7 MG/L
NONHDLC SERPL-MCNC: 152 MG/DL
POTASSIUM SERPL-SCNC: 4.1 MMOL/L (ref 3.5–5.3)
SODIUM SERPL-SCNC: 143 MMOL/L (ref 135–147)
TRIGL SERPL-MCNC: 142 MG/DL (ref ?–150)

## 2025-03-28 PROCEDURE — 80048 BASIC METABOLIC PNL TOTAL CA: CPT

## 2025-03-28 PROCEDURE — 83036 HEMOGLOBIN GLYCOSYLATED A1C: CPT

## 2025-03-28 PROCEDURE — 36415 COLL VENOUS BLD VENIPUNCTURE: CPT

## 2025-03-28 PROCEDURE — 82043 UR ALBUMIN QUANTITATIVE: CPT

## 2025-03-28 PROCEDURE — 82570 ASSAY OF URINE CREATININE: CPT

## 2025-03-28 PROCEDURE — 80061 LIPID PANEL: CPT

## 2025-03-29 LAB
EST. AVERAGE GLUCOSE BLD GHB EST-MCNC: 143 MG/DL
HBA1C MFR BLD: 6.6 %

## 2025-03-30 ENCOUNTER — RESULTS FOLLOW-UP (OUTPATIENT)
Age: 75
End: 2025-03-30

## 2025-04-02 ENCOUNTER — OFFICE VISIT (OUTPATIENT)
Dept: CARDIOLOGY CLINIC | Facility: CLINIC | Age: 75
End: 2025-04-02
Payer: COMMERCIAL

## 2025-04-02 VITALS
BODY MASS INDEX: 33.42 KG/M2 | OXYGEN SATURATION: 96 % | WEIGHT: 212.9 LBS | SYSTOLIC BLOOD PRESSURE: 138 MMHG | HEART RATE: 71 BPM | HEIGHT: 67 IN | DIASTOLIC BLOOD PRESSURE: 80 MMHG

## 2025-04-02 DIAGNOSIS — E78.5 HYPERLIPIDEMIA, UNSPECIFIED HYPERLIPIDEMIA TYPE: ICD-10-CM

## 2025-04-02 DIAGNOSIS — I10 HYPERTENSION, UNSPECIFIED TYPE: ICD-10-CM

## 2025-04-02 DIAGNOSIS — E66.9 OBESITY (BMI 30-39.9): ICD-10-CM

## 2025-04-02 DIAGNOSIS — Z78.9 STATIN INTOLERANCE: ICD-10-CM

## 2025-04-02 DIAGNOSIS — E11.69 TYPE 2 DIABETES MELLITUS WITH OTHER SPECIFIED COMPLICATION, WITHOUT LONG-TERM CURRENT USE OF INSULIN (HCC): ICD-10-CM

## 2025-04-02 DIAGNOSIS — I25.10 CORONARY ARTERY DISEASE INVOLVING NATIVE CORONARY ARTERY OF NATIVE HEART WITHOUT ANGINA PECTORIS: Primary | ICD-10-CM

## 2025-04-02 DIAGNOSIS — Z95.5 S/P DRUG ELUTING CORONARY STENT PLACEMENT: ICD-10-CM

## 2025-04-02 PROCEDURE — 99214 OFFICE O/P EST MOD 30 MIN: CPT | Performed by: INTERNAL MEDICINE

## 2025-04-02 NOTE — PROGRESS NOTES
Cardiology Follow Up Visit       Pamela Bazan   [id number]  1950      HEART [facility] VASCULAR [facility]  [address] [place] [address]      Primary Care Physician:   Demetrius Charlton MD    Reason for Follow Up Visit / Principal Problem:  Mrs. Pamela Bazan is a 74 y.o. female and never smoker with a PMH significant for but not limited to CAD, HLD, HTN, DMII, GERD, and Obesity.  She presents to clinic for routine Follow Up.    Assessment & Plan   CAD, s/p PCI with MARIPOSA x 1 to the mLCX on 03/07/24  Patient is asymptomatic of anginal complaints, Pharmacologic NM MPI with a fixed ant defect with roxanna-infarct ischemia, Attended cardiac rehab in April/May 2024 without complications, Has stopped plavix prior to our visit   on the importance of medication compliance and diet/lifestyle modification, Cont GDMT for CAD on asa / repatha for now (LDL down from 204 on 05/06/22 to 124 on 03/28/25 but still not at goal of Less than 70), Will add bempedoic acid trial at this time, Cont aggressive risk factor modification, Maintain BP log, Cont counseling on diet/lifestyle modification for weight management as well  Monitor closely for symptomatic changes, Record BP/HR and log if symptoms return, ED/Clinic precautions reviewed    HTN, long-standing  No home BP log for review, but clinic SBP in the 115-140 mmHg range  Cont current medication regimen: amlodipine 5, Resume losartan 25 previously taken in 2023, cont counseling on low-sodium diet, Review the importance of maintaining a home BP log  Monitor BP at home routinely and review log on next visit    HLD, recent FLP:  /  / HDL 57 /  on 03/28/25  Improved , Tolerating med mgmt without adverse reactions on repatha  Cont repatha with trial of bempedoic acid, Check FLP prior to follow up, Cont counseling on diet and lifestyle modification  Monitor FLP as noted above, will reassess on next visit    DM  Type II, most recent A1c 6.6 on 03/28/25  Tolerating medical mgmt without progressive symptoms  Check A1c per PCP, Cont current medication regimen  Monitor labs as noted above, will reassess on next visit    Obesity, Body mass index is 33.34 kg/m².  Weight has improved significantly with a 70 lb weight loss from 285 to 210 two years ago , now stable over the last year between 210-215 lb ± 5 lbs  Cont to review the importance of diet and lifestyle modification, hold on referral to  Weight Management but will revisit on next appointment  Will monitor routinely at this time    Discussion / Summary:  Precautions and reasons to call our office or proceed to ER were discussed in detail. Patient expressed understanding and questions were answered. Plan on follow up in-clinic in 4 months.    Office Visit Diagnosis:  1. Coronary artery disease involving native coronary artery of native heart without angina pectoris        2. S/P drug eluting coronary stent placement        3. Hyperlipidemia, unspecified hyperlipidemia type  Bempedoic Acid 180 MG TABS    Lipid Panel with Direct LDL reflex      4. Statin intolerance  Bempedoic Acid 180 MG TABS      5. Hypertension, unspecified type        6. Type 2 diabetes mellitus with other specified complication, without long-term current use of insulin (HCC)        7. Obesity (BMI 30-39.9)            Interval History:  Mrs. Bazan was originally seen in clinic on 02/08/24 for Interventional Cardiology Consultation. She'd been at her baseline state of health: independent of adl's, retired as a CNA , though not exercising regularly, when she  had a fall in Nov 2023 .  She stated that she was at home and walking downstairs when she slipped, hit her chest against the railing, and slid down the stairs.  She managed the event conservatively but eventually saw our partner, Dr. Jiang for ongoing CP on 11/16/23.  She described resting and exertional pain, so she underwent a NM MPI that was  positive for a fixed anterior infarct with roxanna-infarct ischemia.  She'd previously failed several statin trials, and was started on praluent but noted constipation on the medication and discontinued it.  She also noted increased depression and lightheadedness on metoprolol, so that too was discontinued. She was referred to [facility] but had reservations. She denied any recent hospitalizations, prior cardiac history, family history of early cad, or family history of scd. She stated her last C was at [facility] in [place], [place] and was negative obstructive CAD several year ago.     We proceeded with the [facility] on 03/07/24 that was positive for moderate pLAD disease and severe mLCX disease requiring PCI with MARIPOSA x 1 to the LCX. She tolerated the procedure well presented for Hosptial Follow Up on 02/08/24. She noted stable back pain that was managed with TYL. She also noted not fully understanding how to take NTG.  She noted HA's and fatigue when she'd tried it in the past for symptoms other than angina.  She had yet to start cardiac rehab but had plans to get a PhsioCycle soon.    During our 10/02/24 last visit, she completed cardiac rehab at [facility]. She'd been able to continue with the Ergometer/Elliptical daily at 30 mins per day or so.  She also noted ongoing back pain with plans for an MRI soon along with PT for help with core exercises.     Snce our last visit, she's done well from a cardiac standpoint.  However, she was recently treated for Bell's Palsy and has been slowly recovering. She reports being compliant with medications though she stopped taking plavix prior to our visit. She currently denies any anginal cp, diaphoresis, n/v, sob, palpitations, near-syncope, syncope, orthopnea, pnd, or ble edema.  She notes fair control of her diet and exercise habits. She reports a diet that is somewhat balanced but has room for improvement and exercise that is inconsistent but with plans for plans for improvement.  She is otherwise compliant with medications but does not maintain a detailed BP log.  Of note, the patient's cardiac risk factor(s) include: advanced age, hypertension, dyslipidemia, diabetes mellitus, obesity, and sedentary lifestyle.      Subjective   Review of Systems   Constitutional: Negative for chills, diaphoresis, fever and malaise/fatigue.   HENT:  Negative for congestion and sore throat.    Eyes:  Negative for blurred vision and double vision.   Cardiovascular:  Negative for chest pain (no recurrent events), claudication, dyspnea on exertion, leg swelling, near-syncope, orthopnea, palpitations, paroxysmal nocturnal dyspnea and syncope.   Respiratory:  Negative for cough, shortness of breath, sleep disturbances due to breathing, snoring and wheezing.    Hematologic/Lymphatic: Negative for bleeding problem. Does not bruise/bleed easily.   Skin:  Negative for itching and rash.   Musculoskeletal:  Positive for back pain (ongoing). Negative for joint pain and joint swelling.   Gastrointestinal:  Positive for constipation (occasionally, well mangaged). Negative for abdominal pain, diarrhea, nausea and vomiting.   Genitourinary:  Negative for dysuria and hematuria.   Neurological:  Negative for numbness and paresthesias.   Psychiatric/Behavioral:  Negative for depression. The patient is not nervous/anxious.      The following portions of the patient's history were reviewed and updated as appropriate: past medical history, past social history, past family history, past surgical history, current medications, allergies, past surgical history and problem list.  Past Medical History:   Diagnosis Date    Bell's palsy     CAD (coronary artery disease) 03/07/2024    Diabetes (HCC)     Diabetic retinopathy (HCC)     Diverticular disease of colon     Hyperlipidemia     Low back pain     Osteoarthritis     Osteopenia      Social History     Socioeconomic History    Marital status: /Civil Union     Spouse name: Not on  file    Number of children: Not on file    Years of education: Not on file    Highest education level: Not on file   Occupational History    Not on file   Tobacco Use    Smoking status: Never     Passive exposure: Never    Smokeless tobacco: Never   Vaping Use    Vaping status: Never Used   Substance and Sexual Activity    Alcohol use: Not Currently    Drug use: Never    Sexual activity: Not Currently   Other Topics Concern    Not on file   Social History Narrative    Not on file     Social Drivers of Health     Financial Resource Strain: Not on file   Food Insecurity: No Food Insecurity (5/2/2024)    Nursing - Inadequate Food Risk Classification     Worried About Running Out of Food in the Last Year: Never true     Ran Out of Food in the Last Year: Never true     Ran Out of Food in the Last Year: Not on file   Transportation Needs: No Transportation Needs (5/2/2024)    PRAPARE - Transportation     Lack of Transportation (Medical): No     Lack of Transportation (Non-Medical): No   Physical Activity: Not on file   Stress: Not on file   Social Connections: Not on file   Intimate Partner Violence: Not on file   Housing Stability: Unknown (5/2/2024)    Housing Stability Vital Sign     Unable to Pay for Housing in the Last Year: No     Number of Times Moved in the Last Year: Not on file     Homeless in the Last Year: No     No family history on file.  Past Surgical History:   Procedure Laterality Date    CARDIAC CATHETERIZATION N/A 3/7/2024    Procedure: Cardiac catheterization;  Surgeon: Valerie Alvarez DO;  Location: [facility];  Service: Cardiology    CARDIAC CATHETERIZATION N/A 3/7/2024    Procedure: Cardiac pci;  Surgeon: Valerie Alvarez DO;  Location: [facility];  Service: Cardiology    CARDIAC CATHETERIZATION N/A 3/7/2024    Procedure: Cardiac Coronary Angiogram;  Surgeon: Valerie Alvarez DO;  Location: [facility];  Service: Cardiology       Current Outpatient Medications:      amLODIPine (NORVASC) 5 mg tablet, Take 1 tablet (5 mg total) by mouth daily, Disp: 90 tablet, Rfl: 1    aspirin (ECOTRIN LOW STRENGTH) 81 mg EC tablet, Take 81 mg by mouth daily, Disp: , Rfl:     Bempedoic Acid 180 MG TABS, Take 180 mg by mouth in the morning, Disp: 30 tablet, Rfl: 1    Blood Glucose Monitoring Suppl (OneTouch Verio) w/Device KIT, Use 2 (two) times a day, Disp: 1 kit, Rfl: 0    Calcium Carb-Cholecalciferol (calcium carbonate-vitamin D) 500 mg-5 mcg tablet, Take 1 tablet by mouth daily with breakfast, Disp: , Rfl:     calcium carbonate-vitamin D 500 mg-5 mcg per tablet, Take 1 tablet by mouth daily with breakfast, Disp: 30 tablet, Rfl: 5    docusate sodium (COLACE) 100 mg capsule, Take 1 capsule (100 mg total) by mouth daily as needed for constipation, Disp: 30 capsule, Rfl: 0    Evolocumab (Repatha) 140 MG/ML SOSY, Inject 1 mL (140 mg total) under the skin every 14 (fourteen) days for 24 doses, Disp: 6 mL, Rfl: 3    nitroglycerin (NITROSTAT) 0.4 mg SL tablet, Place 1 tablet (0.4 mg total) under the tongue every 5 (five) minutes as needed for chest pain, Disp: 25 tablet, Rfl: 5    olopatadine HCl (PATADAY) 0.2 % opth drops, 1 drop, Disp: , Rfl:     omeprazole (PriLOSEC) 20 mg delayed release capsule, Take 20 mg by mouth daily, Disp: , Rfl:     OneTouch Verio test strip, , Disp: , Rfl:     metFORMIN (GLUCOPHAGE) 500 mg tablet, Take 1 tablet (500 mg total) by mouth in the morning, Disp: 90 tablet, Rfl: 1    pantoprazole (PROTONIX) 40 mg tablet, Take 40 mg by mouth daily (Patient not taking: Reported on 4/2/2025), Disp: , Rfl:     predniSONE 20 mg tablet, Take 20 mg by mouth (Patient not taking: Reported on 4/2/2025), Disp: , Rfl:     valACYclovir (VALTREX) 1,000 mg tablet, Take 1,000 mg by mouth (Patient not taking: Reported on 4/2/2025), Disp: , Rfl:   Allergies   Allergen Reactions    Penicillins Hives and Swelling     Tongue swelling    Simvastatin Myalgia    Atorvastatin Myalgia    Ezetimibe Nausea  "Only    Metoprolol Confusion, Dizziness and Other (See Comments)     Sweating, cold hands and feet.    Naproxen Hives    Plavix [Clopidogrel] Itching    Praluent [Alirocumab] Dizziness     Bruising      Pravastatin Other (See Comments)    Rosuvastatin Myalgia    Sucralfate Nausea Only    Zolpidem Other (See Comments)     Can take naprosyn    Lisinopril Cough     Patient Active Problem List   Diagnosis    CAD (coronary artery disease)    S/P drug eluting coronary stent placement    Diabetes (HCC)    Diabetic retinopathy ([facility])    Hyperlipidemia, unspecified    Low back pain    Diverticular disease of colon    Adverse effect of HMG-CoA reductase inhibitor    Gastroesophageal reflux disease without esophagitis    Osteopenia    Osteoarthritis    Acute right-sided low back pain without sciatica    Type 2 diabetes mellitus with stage 3a chronic kidney disease, without long-term current use of insulin ([facility])       Objective     Vitals:    04/02/25 1314   BP: 138/80   BP Location: Right arm   Patient Position: Sitting   Cuff Size: Standard   Pulse: 71   SpO2: 96%   Weight: 96.6 kg (212 lb 14.4 oz)   Height: 5' 7\" (1.702 m)         Body mass index is 33.34 kg/m².    Physical Exam  Constitutional:       General: She is not in acute distress.     Appearance: She is obese. She is not toxic-appearing.   HENT:      Head: Normocephalic and atraumatic.      Right Ear: External ear normal.      Left Ear: External ear normal.      Nose: Nose normal.   Eyes:      General: No scleral icterus.        Right eye: No discharge.         Left eye: No discharge.   Neck:      Vascular: No carotid bruit.   Cardiovascular:      Rate and Rhythm: Normal rate and regular rhythm.      Pulses: Normal pulses.      Heart sounds: No murmur heard.     No gallop.   Pulmonary:      Effort: Pulmonary effort is normal. No respiratory distress.      Breath sounds: No wheezing or rales.   Musculoskeletal:      Cervical back: Neck supple.      Right lower leg: No " edema.      Left lower leg: No edema.   Skin:     General: Skin is warm and dry.      Capillary Refill: Capillary refill takes less than 2 seconds.   Neurological:      General: No focal deficit present.      Mental Status: She is alert and oriented to person, place, and time.   Psychiatric:         Mood and Affect: Mood normal.         Behavior: Behavior normal.         Labs:  Lab Results   Component Value Date    K 4.1 03/28/2025    K 4.2 02/07/2025    K 4.5 11/06/2024    K 4.7 05/06/2022     03/28/2025     02/07/2025     11/06/2024     05/06/2022    CO2 30 03/28/2025    CO2 32 02/07/2025    CO2 26 11/06/2024    CO2 28 05/06/2022    BUN 15 03/28/2025    BUN 11 02/07/2025    BUN 17 11/06/2024    BUN 16 05/06/2022    CREATININE 1.05 03/28/2025    CREATININE 0.93 02/07/2025    CREATININE 1.08 11/06/2024    CREATININE 1.30 (H) 05/06/2022    EGFR 51 03/28/2025    EGFR 59 02/07/2025    EGFR 49 11/06/2024    GLUC 114 06/06/2024    GLUC 93 03/08/2024    GLUC 114 03/07/2024    GLUC 120 (H) 05/06/2022    AST 19 11/06/2024    AST 17 04/26/2024    AST 18 05/06/2022    ALT 11 11/06/2024    ALT 10 04/26/2024    ALT 10 05/06/2022    TBILI 0.28 11/06/2024    TBILI 0.46 04/26/2024    TBILI 0.5 05/06/2022    ALB 3.8 11/06/2024    ALB 3.7 04/26/2024    ALB 3.8 05/06/2022    CALCIUM 9.3 03/28/2025    CALCIUM 9.5 02/07/2025    CALCIUM 9.4 11/06/2024    CALCIUM 9.4 05/06/2022      Lab Results   Component Value Date    WBC 7.25 11/06/2024    WBC 6.79 03/08/2024    HGB 11.6 11/06/2024    HGB 11.1 (L) 03/08/2024    HCT 37.0 11/06/2024    HCT 33.5 (L) 03/08/2024     11/06/2024     03/08/2024    FERRITIN 34 11/06/2024      Lab Results   Component Value Date    CHOLESTEROL 209 (H) 03/28/2025    CHOLESTEROL 174 11/06/2024    CHOLESTEROL 199 09/27/2024    TRIG 142 03/28/2025    TRIG 119 11/06/2024    TRIG 105 09/27/2024    HDL 57 03/28/2025    HDL 54 11/06/2024    HDL 62 09/27/2024    LDLCALC 124 (H)  "03/28/2025    LDLCALC 96 11/06/2024    LDLCALC 116 (H) 09/27/2024     Lab Results   Component Value Date    HGBA1C 6.6 (H) 03/28/2025    HGBA1C 6.1 (H) 02/07/2025    HGBA1C 6.6 (H) 11/06/2024    GLUF 100 (H) 03/28/2025    GLUF 110 (H) 02/07/2025    GLUF 100 (H) 11/06/2024    POCGLU 108 03/08/2024    POCGLU 94 03/07/2024    POCGLU 108 03/07/2024     No results found for: \"TSH\", \"FT4\"  No results found for: \"HSTNI0\", \"HSTNI2\", \"HSTNI4\", \"TROPONINI\"  No results found for: \"BNP\", \"NTBNP\"     Imaging:  I have personally reviewed pertinent reports.  No results found.    Cardiac Studies:  EKG:   Date: 03/07/24, normal sinus rhythm, T wave findings consistent with lat ischemia  Date: 03/07/24, normal sinus rhythm, nonspecific T wave findings  Date: 11/16/23, sinus rhythm with sinus arrhythmia, lvh with repolarization abnormality    Tele:   No results found for this or any previous visit.     Holter:   No results found for this or any previous visit.    Recent Device Check:  No results found for this or any previous visit.    Previous EPS/Interventions:  No results found for this or any previous visit.    Previous Cath/PCI:  Results for testing completed on the encounter of 03/07/24  Study: LHC / PCI  Interpreted Summary    S/P PCI with MARIPOSA x 1 to the Mid LCX     Left Anterior Descending  Prox LAD lesion is 35% stenosed. Not the culprit lesion. HINA flow is 3.    Left Circumflex  Mid Cx lesion is 85% stenosed. Culprit lesion. Culprit for anginal symptoms.HINA flow is 3.    Right Coronary Artery  Prox RCA lesion is 20% stenosed. Not the culprit lesion. HINA flow is 3.    Previous STRESS TEST:  Results for orders placed during the hospital encounter of 11/30/23  NM myocardial perfusion spect (rx stress and/or rest)  Interpretation Summary    Stress ECG: No ST deviation is noted. There were no arrhythmias during stress. There were no arrhythmias during recovery. . The ECG was not diagnostic due to resting ST-T abnormalities.    " Stress Function: Left ventricular function post-stress is normal. Stress ejection fraction is 62 %.    Perfusion: There is a left ventricular perfusion defect that is large in size with moderate reduction in uptake present in the entire anterior location(s) that is partially reversible. There is likely a small amount of artifact caused by breast attenuation.    Stress Combined Conclusion: Left ventricular perfusion is abnormal. There is mild roxanna-infarct ischemia of the entire anterior wall.  Abnormal pharmacologic perfusion scan. There is an infarct of the entire anterior wall with mild roxanna-infarct ischemia.     ECHO:  Results for testing completed on the encounter of 03/07/24  Study: Echo complete w/ contrast if indicated  Interpreted Summary    Left Ventricle: Left ventricular cavity size is normal. Wall thickness is mildly increased. The left ventricular ejection fraction is 50%. Systolic function is low normal. Diastolic function is mildly abnormal, consistent with grade I (abnormal) relaxation.  Left atrial filling pressure is normal.    The following segments are hypokinetic: basal inferior, basal inferolateral, mid inferior, mid inferolateral, mid anterolateral and apical lateral.    All other segments are normal.    Aortic Valve: There is aortic valve sclerosis.    Mitral Valve: There is mild regurgitation.    QUINCY:  No results found for this or any previous visit.    CMR:  No results found for this or any previous visit.    Counseling / Coordination of Care:  During our visit, I spent 20 minutes with the patient, and greater than 55% of this time was spent on counseling and coordination of care, including addressing diagnostic results, prognosis, risks and benefits of treatment options, instructions for management, patient/family education, importance of treatment compliance, along with risk factor reductions.    Dictation Disclaimer:  This note was completed in part utilizing m-modal fluency direct voice  recognition software. Grammatical errors, random word insertion, spelling mistakes, and incomplete sentences may be an occasional consequence of the system secondary to software limitations, ambient noise and hardware issues. At the time of dictation, efforts were made to edit, clarify and /or correct errors.  Please read the chart carefully and recognize, using context, where substitutions have occurred.  If you have any questions or concerns about the context, text or information contained within the body of this dictation, please contact myself, the provider, for further clarification.     Valerie Alvarez, DO 04/18/25

## 2025-04-03 ENCOUNTER — TELEPHONE (OUTPATIENT)
Dept: CARDIOLOGY CLINIC | Facility: CLINIC | Age: 75
End: 2025-04-03

## 2025-04-03 DIAGNOSIS — Z78.9 STATIN INTOLERANCE: ICD-10-CM

## 2025-04-03 DIAGNOSIS — E78.5 HYPERLIPIDEMIA, UNSPECIFIED HYPERLIPIDEMIA TYPE: ICD-10-CM

## 2025-04-04 NOTE — TELEPHONE ENCOUNTER
Please have provider sign office note from 4/2/25 so it can be used for prior authorization purposes and then route back to the pod once sigend for processing

## 2025-04-09 DIAGNOSIS — N18.31 TYPE 2 DIABETES MELLITUS WITH STAGE 3A CHRONIC KIDNEY DISEASE, WITHOUT LONG-TERM CURRENT USE OF INSULIN (HCC): ICD-10-CM

## 2025-04-09 DIAGNOSIS — E11.22 TYPE 2 DIABETES MELLITUS WITH STAGE 3A CHRONIC KIDNEY DISEASE, WITHOUT LONG-TERM CURRENT USE OF INSULIN (HCC): ICD-10-CM

## 2025-04-18 RX ORDER — LOSARTAN POTASSIUM 25 MG/1
25 TABLET ORAL DAILY
Qty: 30 TABLET | Refills: 3 | Status: SHIPPED | OUTPATIENT
Start: 2025-04-18

## 2025-04-19 DIAGNOSIS — I25.10 CORONARY ARTERY DISEASE INVOLVING NATIVE CORONARY ARTERY OF NATIVE HEART WITHOUT ANGINA PECTORIS: ICD-10-CM

## 2025-04-20 RX ORDER — AMLODIPINE BESYLATE 5 MG/1
5 TABLET ORAL DAILY
Qty: 90 TABLET | Refills: 1 | Status: SHIPPED | OUTPATIENT
Start: 2025-04-20

## 2025-05-09 ENCOUNTER — NURSE TRIAGE (OUTPATIENT)
Age: 75
End: 2025-05-09

## 2025-05-09 ENCOUNTER — APPOINTMENT (OUTPATIENT)
Age: 75
End: 2025-05-09
Payer: COMMERCIAL

## 2025-05-09 DIAGNOSIS — E78.5 HYPERLIPIDEMIA, UNSPECIFIED HYPERLIPIDEMIA TYPE: ICD-10-CM

## 2025-05-09 LAB
CHOLEST SERPL-MCNC: 160 MG/DL (ref ?–200)
HDLC SERPL-MCNC: 48 MG/DL
LDLC SERPL CALC-MCNC: 93 MG/DL (ref 0–100)
TRIGL SERPL-MCNC: 95 MG/DL (ref ?–150)

## 2025-05-09 PROCEDURE — 36415 COLL VENOUS BLD VENIPUNCTURE: CPT

## 2025-05-09 PROCEDURE — 80061 LIPID PANEL: CPT

## 2025-05-09 NOTE — TELEPHONE ENCOUNTER
"FOLLOW UP: Advised patient will inform provider of symptoms and call patient back with further recommendations.     REASON FOR CONVERSATION: Medication Problem  Received a call from the patient and Bhargavi, the pt's care navigation nurse from [facility] with some concerns. The patient was started on Nexlitol 180 mg daily (4/2) and Losartan 25mg daily (4/18) at around the same time and since that time the patient is reporting that she has not been feeling \"right\". She has not been eating well.     The patient reports that she has been drowsy, dizzy and lightheaded at times. She does report that she has been hydrating well. When asked what her blood pressures have been running the patient did not know. She has not been checking. She took her BP while on the phone and it was 179/83, however she is very upset about a matter with her PCP.     The patient feels this is related to the Nexlitol, so she was advised to hold the Nexlizet 180mg rather than the Losartan (due to the current BP) at this time to see if she starts to feel better. Advised will inform the provider of her symptoms and call her back with further recommendations.     DISPOSITION: Callback by PCP Today      Reason for Disposition   Caller has NON-URGENT medicine question about med that PCP or specialist prescribed and triager unable to answer question    Answer Assessment - Initial Assessment Questions  1. NAME of MEDICINE: \"What medicine(s) are you calling about?\"      Nexlitol or Losartan   2. QUESTION: \"What is your question?\" (e.g., double dose of medicine, side effect)      Pt has been feeling drowsy, lightheaded and dizzy; \"does not feel like eating\"  3. PRESCRIBER: \"Who prescribed the medicine?\" Reason: if prescribed by specialist, call should be referred to that group.      Valerie Alvarez    Protocols used: Medication Question Call-Adult-[place]    "

## 2025-05-12 NOTE — TELEPHONE ENCOUNTER
Received call back from Bhargavi bone nurse navigator to report the pt held her Nexlitol over the weekend and is feeling much better.  Her BP's over the weekend were ranging from 110's-120's systolic.  Pt would like to know if there is a different medication she can be on for her cholesterol.  Please review and advise, thank you.

## 2025-05-15 ENCOUNTER — OFFICE VISIT (OUTPATIENT)
Age: 75
End: 2025-05-15
Payer: COMMERCIAL

## 2025-05-15 VITALS
HEIGHT: 67 IN | DIASTOLIC BLOOD PRESSURE: 70 MMHG | BODY MASS INDEX: 32.24 KG/M2 | RESPIRATION RATE: 18 BRPM | WEIGHT: 205.4 LBS | SYSTOLIC BLOOD PRESSURE: 110 MMHG | HEART RATE: 82 BPM | OXYGEN SATURATION: 99 %

## 2025-05-15 DIAGNOSIS — I25.10 CORONARY ARTERY DISEASE INVOLVING NATIVE CORONARY ARTERY OF NATIVE HEART WITHOUT ANGINA PECTORIS: ICD-10-CM

## 2025-05-15 DIAGNOSIS — Z00.00 MEDICARE ANNUAL WELLNESS VISIT, SUBSEQUENT: Primary | ICD-10-CM

## 2025-05-15 DIAGNOSIS — I10 HYPERTENSION, UNSPECIFIED TYPE: ICD-10-CM

## 2025-05-15 DIAGNOSIS — E11.22 TYPE 2 DIABETES MELLITUS WITH STAGE 3A CHRONIC KIDNEY DISEASE, WITHOUT LONG-TERM CURRENT USE OF INSULIN (HCC): ICD-10-CM

## 2025-05-15 DIAGNOSIS — K21.9 GASTROESOPHAGEAL REFLUX DISEASE WITHOUT ESOPHAGITIS: ICD-10-CM

## 2025-05-15 DIAGNOSIS — N18.31 TYPE 2 DIABETES MELLITUS WITH STAGE 3A CHRONIC KIDNEY DISEASE, WITHOUT LONG-TERM CURRENT USE OF INSULIN (HCC): ICD-10-CM

## 2025-05-15 DIAGNOSIS — E78.5 HYPERLIPIDEMIA, UNSPECIFIED HYPERLIPIDEMIA TYPE: ICD-10-CM

## 2025-05-15 PROBLEM — E11.9 DM2 (DIABETES MELLITUS, TYPE 2) (HCC): Status: ACTIVE | Noted: 2024-11-14

## 2025-05-15 PROBLEM — E11.9 DIABETES (HCC): Status: RESOLVED | Noted: 2024-04-11 | Resolved: 2025-05-15

## 2025-05-15 PROCEDURE — G0439 PPPS, SUBSEQ VISIT: HCPCS | Performed by: FAMILY MEDICINE

## 2025-05-15 RX ORDER — OMEPRAZOLE 20 MG/1
20 CAPSULE, DELAYED RELEASE ORAL DAILY
Qty: 90 CAPSULE | Refills: 1 | Status: SHIPPED | OUTPATIENT
Start: 2025-05-15

## 2025-05-15 RX ORDER — LOSARTAN POTASSIUM 25 MG/1
25 TABLET ORAL DAILY
Qty: 90 TABLET | Refills: 1 | Status: SHIPPED | OUTPATIENT
Start: 2025-05-15

## 2025-05-15 RX ORDER — AMLODIPINE BESYLATE 5 MG/1
5 TABLET ORAL DAILY
Qty: 90 TABLET | Refills: 1 | Status: SHIPPED | OUTPATIENT
Start: 2025-05-15

## 2025-05-15 NOTE — ASSESSMENT & PLAN NOTE
Orders:    omeprazole (PriLOSEC) 20 mg delayed release capsule; Take 1 capsule (20 mg total) by mouth in the morning.

## 2025-05-15 NOTE — ASSESSMENT & PLAN NOTE
Unable to take statins. Continue repatha. Low cholesterol diet. Encourage vegetables, fruit, and whole grains. Exercise.    Orders:    Lipid panel; Future

## 2025-05-15 NOTE — ASSESSMENT & PLAN NOTE
ADA diet, carb counting, low fat, high fiber intake. Water or low calorie drinks. Aerobic exercise. Weight loss. Continue metformin. Start jardiance.  Lab Results   Component Value Date    HGBA1C 6.6 (H) 03/28/2025     Orders:    Basic metabolic panel; Future    Hemoglobin A1C; Future    metFORMIN (GLUCOPHAGE) 500 mg tablet; Take 1 tablet (500 mg total) by mouth in the morning

## 2025-05-15 NOTE — PROGRESS NOTES
Name: Pamela Bazna      : 1950      MRN: [id number]  Encounter Provider: Demetrius Charlton MD  Encounter Date: 5/15/2025   Encounter department: [facility] PRIMARY [facility]  :  Assessment & Plan  Medicare annual wellness visit, subsequent  Colonoscopy 10/2023 diverticulosis, no polyps. DDA wants repeat in 5 years.Sees [facility] 2024. Mammogram 2024 benign. Dexa 2024 osteopenia.       Type 2 diabetes mellitus with stage 3a chronic kidney disease, without long-term current use of insulin (HCC)  ADA diet, carb counting, low fat, high fiber intake. Water or low calorie drinks. Aerobic exercise. Weight loss. Continue metformin. Start jardiance.  Lab Results   Component Value Date    HGBA1C 6.6 (H) 2025     Orders:    Basic metabolic panel; Future    Hemoglobin A1C; Future    metFORMIN (GLUCOPHAGE) 500 mg tablet; Take 1 tablet (500 mg total) by mouth in the morning    Coronary artery disease involving native coronary artery of native heart without angina pectoris  Continue ASA, repatha, NTG. Sees [place] [facility].   Orders:    amLODIPine (NORVASC) 5 mg tablet; Take 1 tablet (5 mg total) by mouth daily    Hyperlipidemia, unspecified hyperlipidemia type  Unable to take statins. Continue repatha. Low cholesterol diet. Encourage vegetables, fruit, and whole grains. Exercise.    Orders:    Lipid panel; Future    Hypertension, unspecified type    Orders:    losartan (COZAAR) 25 mg tablet; Take 1 tablet (25 mg total) by mouth daily    Gastroesophageal reflux disease without esophagitis    Orders:    omeprazole (PriLOSEC) 20 mg delayed release capsule; Take 1 capsule (20 mg total) by mouth in the morning.       Preventive health issues were discussed with patient, and age appropriate screening tests were ordered as noted in patient's After Visit Summary. Personalized health advice and appropriate referrals for health education or preventive services given if needed, as noted in patient's  After Visit Summary.    History of Present Illness       Pamela Bazan is here for Medicare annual wellness exam. Denies chest pain, shortness of breath, headache, or visual symptoms. Reviewed and updated PMHx, PSHx, Family Hx, Allergies, and Meds.      -Lipid Panel with Direct LDL reflex:   Collection Time: 05/09/25  9:14 AM       Result                      Value             Ref Range           Cholesterol                 160               See Comment *       Triglycerides               95                See Comment *       HDL, Direct                 48 (L)            >=50 mg/dL          LDL Calculated              93                0 - 100 mg/dL     Date- 3/28/25    FBS- 100  A1c- 6.6  crea- 1.05  eGFR- 51  urine microalb- unable to calculate.  AST - N        Patient Care Team:  Demetrius Charlton MD as PCP - General (Family Medicine)    Review of Systems   Constitutional:  Negative for fatigue.   Respiratory:  Negative for shortness of breath.    Cardiovascular:  Negative for chest pain.   Gastrointestinal:  Negative for abdominal pain, constipation and diarrhea.   Genitourinary:  Negative for dysuria.   Neurological:  Negative for dizziness.     Medical History Reviewed by provider this encounter:  Tobacco  Allergies  Meds  Problems  Med Hx  Surg Hx  Fam Hx       Annual Wellness Visit Questionnaire   Pamela is here for her Subsequent Wellness visit. Last Medicare Wellness visit information reviewed, patient interviewed and updates made to the record today.      Health Risk Assessment:   Patient rates overall health as very good. Patient feels that their physical health rating is same. Patient is very satisfied with their life. Eyesight was rated as same. Hearing was rated as same. Patient feels that their emotional and mental health rating is same. Patients states they are never, rarely angry. Patient states they are never, rarely unusually tired/fatigued. Pain experienced in the last 7 days has been  a lot. Patient's pain rating has been 8/10. Patient states that she has experienced no weight loss or gain in last 6 months.     Depression Screening:   PHQ-2 Score: 0      Fall Risk Screening:   In the past year, patient has experienced: no history of falling in past year      Urinary Incontinence Screening:   Patient has not leaked urine accidently in the last six months.     Home Safety:  Patient does not have trouble with stairs inside or outside of their home. Patient has working smoke alarms and has working carbon monoxide detector. Home safety hazards include: none.     Nutrition:   Current diet is Regular.     Medications:   Patient is not currently taking any over-the-counter supplements. Patient is able to manage medications.     Activities of Daily Living (ADLs)/Instrumental Activities of Daily Living (IADLs):   Walk and transfer into and out of bed and chair?: Yes  Dress and groom yourself?: Yes    Bathe or shower yourself?: Yes    Feed yourself? Yes  Do your laundry/housekeeping?: Yes  Manage your money, pay your bills and track your expenses?: Yes  Make your own meals?: Yes    Do your own shopping?: Yes    Durable Medical Equipment Suppliers  None    Previous Hospitalizations:   Any hospitalizations or ED visits within the last 12 months?: Yes    How many hospitalizations have you had in the last year?: 3-4    Hospitalization Comments: [facility]    Advance Care Planning:   Living will: Yes    Durable POA for healthcare: No    Advanced directive: Yes      Preventive Screenings      Cardiovascular Screening:    General: Screening Not Indicated and History Lipid Disorder      Diabetes Screening:     General: Screening Not Indicated and History Diabetes      Colorectal Cancer Screening:     General: Screening Not Indicated      Cervical Cancer Screening:    General: Screening Not Indicated      Osteoporosis Screening:    General: Screening Current      Abdominal Aortic Aneurysm (AAA) Screening:         "General: Screening Not Indicated      Lung Cancer Screening:     General: Screening Not Indicated      Hepatitis C Screening:    General: Screening Current    Screening, Brief Intervention, and Referral to Treatment (SBIRT)     Screening  Typical number of drinks in a day: 0  Typical number of drinks in a week: 0  Interpretation: Low risk drinking behavior.    AUDIT-C Screenin) How often did you have a drink containing alcohol in the past year? never  2) How many drinks did you have on a typical day when you were drinking in the past year? 0  3) How often did you have 6 or more drinks on one occasion in the past year? never    AUDIT-C Score: 0  Interpretation: Score 0-2 (female): Negative screen for alcohol misuse    Single Item Drug Screening:  How often have you used an illegal drug (including marijuana) or a prescription medication for non-medical reasons in the past year? never    Single Item Drug Screen Score: 0  Interpretation: Negative screen for possible drug use disorder    Social Drivers of Health     Food Insecurity: No Food Insecurity (2025)    Nursing - Inadequate Food Risk Classification     Worried About Running Out of Food in the Last Year: Never true     Ran Out of Food in the Last Year: Never true   Transportation Needs: No Transportation Needs (2025)    PRAPARE - Transportation     Lack of Transportation (Medical): No     Lack of Transportation (Non-Medical): No   Housing Stability: Low Risk  (2025)    Housing Stability Vital Sign     Unable to Pay for Housing in the Last Year: No     Number of Times Moved in the Last Year: 0     Homeless in the Last Year: No   Utilities: Not At Risk (2025)    [facility] Utilities     Threatened with loss of utilities: No     No results found.    Objective   /70 (BP Location: Left arm, Patient Position: Sitting, Cuff Size: Standard)   Pulse 82   Resp 18   Ht 5' 7\" (1.702 m)   Wt 93.2 kg (205 lb 6.4 oz)   SpO2 99%   BMI 32.17 kg/m² "     Physical Exam  Vitals and nursing note reviewed.   Constitutional:       Appearance: She is well-developed.   HENT:      Head: Normocephalic and atraumatic.     Eyes:      Conjunctiva/sclera: Conjunctivae normal.       Cardiovascular:      Rate and Rhythm: Normal rate and regular rhythm.      Heart sounds: No murmur heard.  Pulmonary:      Breath sounds: Normal breath sounds.   Abdominal:      Palpations: Abdomen is soft.     Musculoskeletal:      Cervical back: Neck supple.     Skin:     General: Skin is warm and dry.     Neurological:      Mental Status: She is alert.     Psychiatric:         Mood and Affect: Mood normal.

## 2025-05-15 NOTE — ASSESSMENT & PLAN NOTE
Continue ASA, repatha, [facility]   Orders:    amLODIPine (NORVASC) 5 mg tablet; Take 1 tablet (5 mg total) by mouth daily

## 2025-05-22 ENCOUNTER — RESULTS FOLLOW-UP (OUTPATIENT)
Dept: CARDIOLOGY CLINIC | Facility: MEDICAL CENTER | Age: 75
End: 2025-05-22

## 2025-06-13 DIAGNOSIS — Z12.31 ENCOUNTER FOR SCREENING MAMMOGRAM FOR BREAST CANCER: Primary | ICD-10-CM

## 2025-06-16 ENCOUNTER — TELEPHONE (OUTPATIENT)
Age: 75
End: 2025-06-16

## 2025-06-16 DIAGNOSIS — M25.562 ACUTE PAIN OF LEFT KNEE: Primary | ICD-10-CM

## 2025-06-16 RX ORDER — TRAMADOL HYDROCHLORIDE 50 MG/1
50 TABLET ORAL 2 TIMES DAILY PRN
Qty: 15 TABLET | Refills: 0 | Status: SHIPPED | OUTPATIENT
Start: 2025-06-16

## 2025-06-16 NOTE — TELEPHONE ENCOUNTER
Patients  called the office requesting for the provider to place in a refill for Tramadol 50 Mg.  states pt was recently in the hospital for LT knee pain and was prescribe this medication for 3 days. Patient is all out of medication and is in a lot of pain. Pt was scheduled with Ortho for this Wednesday and will only need this medication to help her get through until then. Please review and advise,  would like a call back

## 2025-06-19 ENCOUNTER — RESULTS FOLLOW-UP (OUTPATIENT)
Age: 75
End: 2025-06-19

## 2025-06-24 ENCOUNTER — TELEPHONE (OUTPATIENT)
Age: 75
End: 2025-06-24

## 2025-06-24 NOTE — TELEPHONE ENCOUNTER
Bhargavi calling [facility] Nurse Navigator on behalf of the patient. She states since restarting Jardiance patient it reporting loss of appetite which is resulting in overnight low blood glucose.  Asking for Primary Care Provider's advice and what her next steps should be. Nurse advised patient not to stop Jardiance until further instructions are relayed from Primary Care Provider.  Please call patient and advise.

## 2025-06-25 NOTE — TELEPHONE ENCOUNTER
Spoke to patient. Jardiance stopped  and she will follow up with Dr. Charlton for medication change.

## 2025-07-13 DIAGNOSIS — N18.31 TYPE 2 DIABETES MELLITUS WITH STAGE 3A CHRONIC KIDNEY DISEASE, WITHOUT LONG-TERM CURRENT USE OF INSULIN (HCC): Primary | ICD-10-CM

## 2025-07-13 DIAGNOSIS — E11.22 TYPE 2 DIABETES MELLITUS WITH STAGE 3A CHRONIC KIDNEY DISEASE, WITHOUT LONG-TERM CURRENT USE OF INSULIN (HCC): Primary | ICD-10-CM

## 2025-07-15 RX ORDER — BLOOD SUGAR DIAGNOSTIC
STRIP MISCELLANEOUS 2 TIMES DAILY
Qty: 100 STRIP | Refills: 1 | Status: SHIPPED | OUTPATIENT
Start: 2025-07-15

## 2025-08-08 ENCOUNTER — APPOINTMENT (OUTPATIENT)
Age: 75
End: 2025-08-08
Payer: COMMERCIAL

## 2025-08-13 ENCOUNTER — TELEPHONE (OUTPATIENT)
Dept: ADMINISTRATIVE | Facility: OTHER | Age: 75
End: 2025-08-13

## 2025-08-15 ENCOUNTER — OFFICE VISIT (OUTPATIENT)
Age: 75
End: 2025-08-15
Payer: COMMERCIAL

## 2025-08-20 ENCOUNTER — TELEPHONE (OUTPATIENT)
Age: 75
End: 2025-08-20

## (undated) DEVICE — HI-TORQUE BALANCE MIDDLEWEIGHT GUIDE WIRE W/HYDROCOAT .014 J TIP 3.0 CM X 190 CM: Brand: HI-TORQUE BALANCE MIDDLEWEIGHT

## (undated) DEVICE — TR BAND RADIAL ARTERY COMPRESSION DEVICE: Brand: TR BAND

## (undated) DEVICE — BALLOON EUPHORA RX 2 X 15MM

## (undated) DEVICE — RADIFOCUS OPTITORQUE ANGIOGRAPHIC CATHETER: Brand: OPTITORQUE

## (undated) DEVICE — Device: Brand: ASAHI SILVERWAY

## (undated) DEVICE — RUNTHROUGH NS EXTRA FLOPPY PTCA GUIDEWIRE: Brand: RUNTHROUGH

## (undated) DEVICE — Device

## (undated) DEVICE — CATH GUIDE LAUNCHER 6FR EBU 3.5

## (undated) DEVICE — GLIDESHEATH BASIC HYDROPHILIC COATED INTRODUCER SHEATH: Brand: GLIDESHEATH

## (undated) DEVICE — DGW .035 FC J3MM 260CM TEF: Brand: EMERALD

## (undated) DEVICE — MINI TREK CORONARY DILATATION CATHETER 2.0 MM X 15 MM / RAPID-EXCHANGE: Brand: MINI TREK